# Patient Record
Sex: FEMALE | Race: WHITE
[De-identification: names, ages, dates, MRNs, and addresses within clinical notes are randomized per-mention and may not be internally consistent; named-entity substitution may affect disease eponyms.]

---

## 2017-01-19 ENCOUNTER — HOSPITAL ENCOUNTER (EMERGENCY)
Dept: HOSPITAL 75 - ER | Age: 27
Discharge: HOME | End: 2017-01-19
Payer: MEDICAID

## 2017-01-19 VITALS — BODY MASS INDEX: 42.11 KG/M2 | HEIGHT: 65 IN | WEIGHT: 252.75 LBS

## 2017-01-19 VITALS — DIASTOLIC BLOOD PRESSURE: 81 MMHG | SYSTOLIC BLOOD PRESSURE: 134 MMHG

## 2017-01-19 DIAGNOSIS — N92.0: Primary | ICD-10-CM

## 2017-01-19 LAB
BASOPHILS # BLD AUTO: 0 10^3/UL (ref 0–0.1)
BASOPHILS NFR BLD AUTO: 0 % (ref 0–10)
EOSINOPHIL # BLD AUTO: 0.1 10^3/UL (ref 0–0.3)
EOSINOPHIL NFR BLD AUTO: 1 % (ref 0–10)
ERYTHROCYTE [DISTWIDTH] IN BLOOD BY AUTOMATED COUNT: 12.8 % (ref 10–14.5)
LYMPHOCYTES # BLD AUTO: 2.8 X 10^3 (ref 1–4)
LYMPHOCYTES NFR BLD AUTO: 39 % (ref 12–44)
MCH RBC QN AUTO: 32 PG (ref 25–34)
MCHC RBC AUTO-ENTMCNC: 35 G/DL (ref 32–36)
MCV RBC AUTO: 92 FL (ref 80–99)
MONOCYTES # BLD AUTO: 0.6 X 10^3 (ref 0–1)
MONOCYTES NFR BLD AUTO: 8 % (ref 0–12)
NEUTROPHILS # BLD AUTO: 3.8 X 10^3 (ref 1.8–7.8)
NEUTROPHILS NFR BLD AUTO: 52 % (ref 42–75)
PLATELET # BLD: 305 10^3/UL (ref 130–400)
PMV BLD AUTO: 9.4 FL (ref 7.4–10.4)
RBC # BLD AUTO: 4.23 10^6/UL (ref 4.35–5.85)
WBC # BLD AUTO: 7.2 10^3/UL (ref 4.3–11)

## 2017-01-19 PROCEDURE — 36415 COLL VENOUS BLD VENIPUNCTURE: CPT

## 2017-01-19 PROCEDURE — 99283 EMERGENCY DEPT VISIT LOW MDM: CPT

## 2017-01-19 PROCEDURE — 84703 CHORIONIC GONADOTROPIN ASSAY: CPT

## 2017-01-19 PROCEDURE — 96372 THER/PROPH/DIAG INJ SC/IM: CPT

## 2017-01-19 PROCEDURE — 85025 COMPLETE CBC W/AUTO DIFF WBC: CPT

## 2017-01-19 NOTE — XMS REPORT
Continuity of Care Document

 Created on: 2015



YULI SANDS

External Reference #: [id number]

: 1990

Sex: Female



Demographics







 Address  [address] [address]

 

 Home Phone  [phone]

 

 Preferred Language  English

 

 Marital Status  Unknown

 

 Alevism Affiliation  Unknown

 

 Race  Unknown

 

 Ethnic Group  Unknown





Author







 Author  [facility]

 

 Organization  [facility]

 

 Address  Unknown

 

 Phone  Unavailable







Support







 Name  Relationship  Address  Phone

 

 JOEY CULVER MD  Caregiver  [address], [address]

[place]  [address]  [phone]

 

 DAHLIA CLAIRE MD  Caregiver  [address], [address]

[place]  [address]  [phone]

 

 NOEMY SANDS  Next Of Kin  [address] [place] [place] [address] [phone]







Care Team Providers







 Care Team Member Name  Role  Phone

 

 JOEY CULVER MD  PCP  [phone]







Insurance Providers







 Payer Name  Policy Number  Subscriber Name  Relationship

 

 Self Pay     Yuli Sands  18 Self / Same As Patient







Advance Directives







 Directive  Response  Recorded Date/Time

 

 Advance Directives  No  05/30/15 6:31am

 

 Health Care Power of   No  05/30/15 6:31am

 

 Organ Donor  Yes  05/30/15 6:31am

 

 Resuscitation Status  Full Code  05/30/15 6:31am







Problems

Medical Problems





 Problem  Onset Date  Status

 

 Urinary tract infection in pregnancy  Unknown  Active

 

 urinary tract infection  Unknown  Active

 

 Low back pain  Unknown  Active

 

 Threatened   Unknown  Active

 

 Bacterial vaginosis  Unknown  Active

 

 Urinary tract infection in pregnancy  Unknown  Active

 

 pregnancy  Unknown  Active

 

 Influenza  Unknown  Active

 

 Viral infection  Unknown  Active







Medications







 Medication  Dose  Route  Sig  Days/Qty  Instructions  Order Date  Discontinued 
Date  Status

 

 Sertraline HCl                 07  Discontinued

 

 Loratadine                 07  Discontinued

 

 [Tri-Nesa]                 09  Discontinued

 

 Trimethoprim/Sulfamethoxazole  1 Ea  PO  TWICE A DAY  20 Qty     09  Discontinued

 

 Nitrofurantoin Macrocrystals  1 Each  PO  TWICE A DAY  30 Qty  FOR INFECTION  
09  Discontinued

 

 Naproxen  1 Each  PO  TID PRN  20 Qty     09  Discontinued

 

 Acetaminophen/Hydrocodone Bitart                 09  
Discontinued

 

 Acetaminophen/Hydrocodone Bitart  1 - 2 Each  PO  Q4HR PRN  20 Qty     09  Discontinued

 

 [Ibuprofen]                 12/23/09  06/19/10  Discontinued

 

 Amoxicillin  1 Each  PO  FOUR TIMES DAILY  40 Qty  FOR INFECTION  12/23/09  01/
15/10  Discontinued

 

 Methylprednisolone  0  PO  AS DIRECTED  1 Qty     12/23/09  01/15/10  
Discontinued

 

 Azithromycin (Zpak)  0  PO  Z-SHASTA  6 Qty     02/24/10  03/26/10  Discontinued

 

 Promethazine Hcl                 03/26/10  06/19/10  Discontinued

 

 Cephalexin Monohydrate (Keflex)  1 Each  PO  FOUR TIMES DAILY  40 Qty     03/26
/10  02/19/11  Discontinued

 

 Promethazine HCl  25 Mg  IA  EVERY 4HRS  14 Qty     03/26/10  02/19/11  
Discontinued

 

 Metronidazole (Flagyl)  1 Each  PO  THREE TIMES A DAY  30 Qty     03/31/10  02/
19/11  Discontinued

 

 Nitrofurantoin Macrocrystals  1 Each  PO  TWICE A DAY  20 Qty  FOR INFECTION  
06/19/10  02/19/11  Discontinued

 

 Naproxen  1 Each  PO  TID PRN  20 Qty     06/19/10  02/19/11  Discontinued

 

 Ergocalciferol  50,000 Unit  PO  TWICE A WEEK        09/19/10  04/23/11  
Discontinued

 

 Omeprazole                 11  Discontinued

 

 Dicyclomine Hcl  1 Each  PO  QID PRN  14 Qty     11  
Discontinued

 

 Acetaminophen/Hydrocodone Bitart (Lorcet 5/325MG)  1 - 2 Tab  PO  AS NEEDED  
28 Qty  1-2 tabs  every 4 hrs. as needed for pain.  11  
Discontinued

 

 Clarithromycin (Biaxin Xl)  2 Each  PO  DAILY        11  
Discontinued

 

 Tetracycline Hcl  500 Mg  PO  TWICE A DAY        11  
Discontinued

 

 Omeprazole  20 Mg  PO  AS NEEDED        11  Discontinued

 

 Cefprozil  1 Each  PO  TWICE A DAY  20 Qty     11  Discontinued

 

 Prednisone  40 Mg  PO  DAILY  6 Qty  FOR PAIN AND SWELLING  11
  Discontinued

 

 Prenatal Vits W-Ca,Fe,Fa(<1MG)  1 Each  PO  DAILY        11  
Discontinued

 

 [Expectra]  1 Tab  PO  DAILY        11  Discontinued

 

 Naproxen Sodium  550 Mg  PO  TWICE A DAY  30 Qty     11  
Discontinued

 

 Acetaminophen/Hydrocodone Bitart  1 - 2 Ea  PO  Q4HR PRN  30 Qty     11  Discontinued

 

 Phentermine Hcl  30 Mg  PO  DAILY        10/11/11  10/03/13  Discontinued

 

 Clindamycin HCl  1 Each  PO  GIVE EVERY 6 HR ON SCHEDULE        10/30/11  10/03
/13  Discontinued

 

 Naproxen Sodium  220 Mg  PO           10/30/11  10/03/13  Discontinued

 

 Hydrocodone Bit/Acetaminophen  1 Each  PO           10/30/11  10/03/13  
Discontinued

 

 Clindamycin HCl  2 Each  PO  THREE TIMES A DAY  7 Days     10/30/11  10/03/13  
Discontinued

 

 Folic Acid  0.4 Mg  PO  BEDTIME        10/03/13  02/12/14  Discontinued

 

 Prenatal Vits W-Ca,Fe,Fa(<1MG)  1 Tab  PO  BEDTIME        10/03/13  12/24/14  
Discontinued

 

 Acetaminophen/Hydrocodone Bitart  1 - 2 Tab  PO  q3hrs. PRN  30 Qty     10/05/
13  01/18/14  Discontinued

 

 Docosahexanoic Acid  100 Mg  PO  DAILY        14  Discontinued

 

 Trimethoprim/Sulfamethoxazole  1 Ea  PO  TWICE A DAY  14 Qty  FOR INFECTION  14  Discontinued

 

 Metronidazole  1 Appful  VG  BEDTIME  5 Qty     14  
Discontinued

 

 Acetaminophen/Hydrocodone Bitart  1 Each  PO  EVERY 4HRS PRN PAIN  6 Qty     14  Discontinued

 

 Nitrofurantoin Macrocrystals  1 Cap  PO  TWICE A DAY  14 Qty     14  Discontinued

 

 Nitrofurantoin Macrocrystals  1 Cap  PO  TWICE A DAY  14 Qty  Clcr <60 mL/
minute: Contraindicated  14  Discontinued

 

 Nitrofurantoin Macrocrystals  1 Cap  PO  TWICE A DAY  10 Qty     14  Discontinued

 

 Cephalexin Monohydrate (Keflex)  1 Each  PO  THREE TIMES A DAY  21 Qty     04/
01/14  05/10/14  Discontinued

 

 Hydrocodone Bit/Acetaminophen  1 Tab  PO  EVERY 4HRS PRN PAIN  10 Qty     14  Discontinued

 

 Ondansetron  8 Mg  PO  EVERY 6 HOURS PRN NAUSEA/VOMITING  10 Qty     14  Discontinued

 

 Docosahexanoic Acid  100 Mg  PO  DAILY        05/10/14  08/08/14  Discontinued

 

 Famotidine  20 Mg  PO  TWICE A DAY PRN P  0 Qty     05/10/14  08/08/14  
Discontinued

 

 Omeprazole  40 Mg  PO  DAILY  30 Qty     14  Discontinued

 

 [Ibuprofen]  600 Mg  PO  EVERY 8HRS For PAIN  40 Qty     14  
Discontinued

 

 Ferrous Sulfate  325 Mg  PO  TWICE A DAY  60 Qty     14  
Discontinued

 

 Acetaminophen/Hydrocodone Bitart  1 Each  PO  EVERY 4HRS For PAIN  20 Qty     
14  Discontinued

 

 Oseltamivir Phosphate (Tamiflu)  75 Mg  PO  TWICE A DAY  10 Qty     12/24/14  
05/30/15  Discontinued

 

 Levofloxacin  750 Mg  PO  DAILY  5 Qty     12/24/14  05/30/15  Discontinued







Social History







 Social History Problem  Response  Recorded Date/Time

 

 Alcohol Use  Denies Use  2015 6:31am

 

 Recreational Drug Use  No  2015 6:31am

 

 Recent Foreign Travel  No  09/15/2014 6:47am

 

 Recent Infectious Disease Exposure  No  09/15/2014 6:47am

 

 Hospitalization with Isolation  Denies  2015 6:31am

 

 Sexually Transmitted Disease  No  2015 6:31am

 

 HIV/AIDS  No  2015 6:31am

 

 Smoking Status  Never a Smoker  2015 6:31am









 Query  Response  Start Date  Stop Date

 

 Smoking Status  Never a Smoker      







Hospital Discharge Instructions

No hospital discharge instructions.



Plan of Care

No plan of care.



Functional Status

No functional status results.



Allergies, Adverse Reactions, Alerts







 Allergen  Type  Severity  Reaction  Status  Last Updated

 

 No Known Drug Allergies           Active  14







Immunizations







 Name  Given  Type

 

 Date of Influenza Vaccine  14  Historical

 

 Hepatitis A  No  Historical

 

 Hepatitis B  Yes  Historical

 

 Tetanus Booster (TDap)  More than 5yrs  Historical







Vital Signs

Acute Vital Signs





 Vital  Response  Date/Time

 

 Temperature (Fahrenheit)  97.6 degrees F (97.6 - 99.5)   

 

 Temperature (Calculated Celsius)  36.00202 degrees C (36.4 - 37.5)   

 

 Pulse Rate (adult)  97 bpm (60 - 90)   

 

 Respiratory Rate  18 bpm (12 - 24)   

 

 O2 Sat by Pulse Oximetry  96 % (88 - 100)   

 

 Blood Pressure  130/63 mm Hg   

 

 Pain      

 

    Pain Intensity  1     

 

 Height (Feet)  5 feet    

 

 Height (Inches)  5 inches    

 

 Height (Calculated Centimeters)  165.349577 cm    

 

 Weight (Pounds)  260 pounds    

 

 Weight (Calculated Kilograms)  117.284296 kilograms    

 

 Calculated BMI  43.26     







Results

Laboratory Results







 Test Name  Result  Units  Flags  Reference  Collection Date/Time  Result Date/
Time  Comments

 

 White Blood Count  11.9  10^3/uL  H  4.3-11.0  2015 6:50am  2015 7:
05am   

 

 Red Blood Count  4.41  10^6/uL     4.35-5.85  2015 6:50am  2015 7:
05am   

 

 Hemoglobin  13.2  G/DL     11.5-16.0  2015 6:50am  2015 7:05am   

 

 Hematocrit  38  %     35-52  2015 6:50am  2015 7:05am   

 

 Mean Corpuscular Volume  87  FL     80-99  2015 6:50am  2015 7:
05am   

 

 Mean Corpuscular Hemoglobin  30  PG     25-34  2015 6:50am  2015 7:
05am   

 

 Mean Corpuscular Hemoglobin Concent  35  G/DL     32-36  2015 6:50am   7:05am   

 

 Red Cell Distribution Width  13.7  %     10.0-14.5  2015 6:50am  2015 7:05am   

 

 Platelet Count  329  10^3/uL     130-400  2015 6:50am  2015 7:05am
   

 

 Mean Platelet Volume  9.9  FL     7.4-10.4  2015 6:50am  2015 7:
05am   

 

 Neutrophils (%) (Auto)  60  %     42-75  2015 6:50am  2015 7:05am 
  

 

 Lymphocytes (%) (Auto)  31  %     12-44  2015 6:50am  2015 7:05am 
  

 

 Monocytes (%) (Auto)  8  %     0-12  2015 6:50am  2015 7:05am   

 

 Eosinophils (%) (Auto)  1  %     0-10  2015 6:50am  2015 7:05am   

 

 Basophils (%) (Auto)  0  %     0-10  2015 6:50am  2015 7:05am   

 

 Neutrophils # (Auto)  7.2  X 10^3     1.8-7.8  2015 6:50am  2015 7:
05am   

 

 Lymphocytes # (Auto)  3.7  X 10^3     1.0-4.0  2015 6:50am  2015 7:
05am   

 

 Monocytes # (Auto)  1.0  X 10^3     0.0-1.0  2015 6:50am  2015 7:
05am   

 

 Eosinophils # (Auto)  0.1  10^3/uL     0.0-0.3  2015 6:50am  2015 7
:05am   

 

 Basophils # (Auto)  0.0  10^3/uL     0.0-0.1  2015 6:50am  2015 7:
05am   

 

 Group A Streptococcus Screen  NEGATIVE        NEGATIVE  2015 6:50am   7:10am   







Procedures

No known history of procedures.



Encounters







 Encounter  Location  Date/Time

 

 Departed Emergency Room  [facility]  05/30/15 6:28am











 Recent Diagnosis

## 2017-01-19 NOTE — ED GU-FEMALE
General


Chief Complaint:  -Female


Stated Complaint:  HEAVY VAG BLEEDING,SHARP VAG PAIN


Nursing Triage Note:  


pt reports has had vaginal bleeding since the 7th of dec. pt reports she was 


seen by  last week and started bc regimine. Pt reports no improvement. 

Pt 


reports lower abdominal pain starting this am.


Nursing Sepsis Screen:  No Definite Risk


Source:  patient


Exam Limitations:  no limitations





History of Present Illness


Time seen by provider:  07:38


Initial Comments


The patient is a 26-year-old white female who reports that she has been having 

vaginal bleeding daily.  She reports that her last normal menstrual period Was 

in May 2016.  In June she missed her period and her pregnancy test was 

positive.  She then miscarried.  She spotted and had no period until August 

when she had what appeared to be a normal menses.  Since that time she has 

spotted until December 7 when she began to flow on a daily basis.  About that 

time she was started on birth control pills by Dr. Culver.  She has had pain at 

intervals which she does not describe  as cramping.  This morning while in the 

shower she reports that the blood began to pour out and she had more severe 

pain which does not seem to be cramping.


Timing/Duration:  changing over time


Severity/Quality:  severe


Location:  suprapubic


Radiation:  back


Activities at Onset:  none


Associated Symptoms:   denies symptoms





Allergies and Home Medications


Allergies


Coded Allergies:  


     No Known Drug Allergies (Unverified , 9/17/14)





Home Medications


Hydrocodone/Acetaminophen 1 Each Tablet #20 1 EACH PO Q4H 


   Prescribed by: MASOUD GUADARRAMA on 6/18/16 0638


Norgestimate-Ethinyl Estradiol 1 Each Tablet #28 1 TAB PO DAILY (Reported) 


Ondansetron 8 Mg Tab.rapdis #14 8 MG PO Q4H 


   Prescribed by: MASOUD GUADARRAMA on 6/18/16 0638


Phentermine HCl 37.5 Mg Capsule 37.5 MG PO DAILY (Reported) 





Constitutional:   see HPI


EENTM:   no symptoms reported


Respiratory:   no symptoms reported


Cardiovascular:   no symptoms reported


Gastrointestinal:   no symptoms reported


Musculoskeletal:   no symptoms reported


Skin:   no symptoms reported


Psychiatric/Neurological:   No Symptoms Reported


Endocrine:   No Symptoms Reported


Hematologic/Lymphatic:   No Symptoms Reported





Past Medical-Social-Family Hx


Patient Social History


Alcohol Use:  Denies Use


Recreational Drug Use:  No


Smoking Status:  Former Smoker


Type Used:  Cigarettes


Former Smoker/When Quit:  Sep 15, 2012


Recent Foreign Travel:  No


Contact w/Someone Who Travel:  No


Recent Infectious Disease Expo:  No


Recent Hopitalizations:  Yes (tonsillitis)


Physical Abuse Screen:  No


Sexual Abuse:  No





Immunizations Up To Date


Tetanus Booster (TDap):  More than 5yrs


Date of Influenza Vaccine:  Aug 4, 2016





Surgeries


HX Surgeries:  Yes (endometriosis, ovarian cyst removal, dnc, )


Surgeries:  Gallbladder





Respiratory


Hx Respiratory Disorders:  No





Cardiovascular


Hx Cardiac Disorders:  No





Neurological


Hx Neurological Disorders:  No





Reproductive System


Pregnant:  No


Hx Reproductive Disorders:  Yes (DUB, OVARIAN CYSTS, SEPTUM REMOVED FROM UTERUS

; MULTIPLE MISCARRIAGES)


Sexually Transmitted Disease:  No


HIV/AIDS:  No


Female Reproductive Disorders:  Endometriosis, Ovarian Cyst





Genitourinary


Hx Genitourinary Disorders:  No





Gastrointestinal


Hx Gastrointestinal Disorders:  Yes (gallbladder removed 2011)


Gastrointestinal Disorders:  Gall Bladder Disease





Musculoskeletal


Hx Musculoskeletal Disorders:  No





Endocrine


Hx Endocrine Disorders:  No





HEENT


HX ENT Disorders:  Yes (corrective lens)


HEENT Disorders:  Tonsilitis


Loss of Vision:  Bilateral


Hearing Impairment:  Denies





Cancer


Hx Cancer:  No





Psychosocial


Hx Psychiatric Problems:  No





Integumentary


HX Skin/Integumentary Disorder:  No





Blood Transfusions


Hx Blood Disorders:  No





Family Medical History


Family Medial History:  


Asthma


  19 MOTHER


  maternal gdma


Cardiovascular disease


  maternal gdma


Completed stroke


  MATERNAL GDPA


Deafness or hearing loss


  19 MOTHER


Diabetes mellitus


  19 FATHER


Myocardial infarction


  maternal gdma


  MATERNAL GDPA


Psychosocial problem


  19 MOTHER


Respiratory disorder


  19 MOTHER


  maternal gdma


Seizure disorder


  19 MOTHER (EPILEPSY)


  G8 BROTHER (EPILEPSY)





No Family History of:


  AIDS


  Abdominal aortic aneurysm


  [place]'s disease


  Alcoholism


  Alzheimer's disease


  Aphasia


  Arthritis


  Cancer of mouth


  Cataracts


  Colon cancer


  Congenital disease


  Congenital heart disease


  Coronary thrombosis


  Cystic fibrosis


  Dementia


  Drug abuse


  Dysphasia


  Fibrocystic disease of breast


  Gastroenteritis


  Glaucoma


  Headache disorder


  Hypercholesterolemia


  Hypertension


  Infertility


  Kidney disease


  Neoplasm


  Not obtainable due to adoption


  Osteoporosis


  Parkinson's disease


  Prostate cancer


  Severe allergy


  Thyroid disease


  Tuberculosis


  Visual disorder





Physical Exam


Vital Signs





 Vital Sign - Last 12Hours








 1/19/17





 06:45


 


Temp 97.2


 


Pulse 79


 


Resp 18


 


B/P 127/92


 


Pulse Ox 99


 


O2 Delivery Room Air





Capillary Refill : Less Than 3 Seconds


General Appearance:   mild distress


HEENT:   normal ENT inspection


Neck:   full range of motion


Cardiovascular:   normal peripheral pulses regular rate, rhythm no edema no 

gallop no JVD no murmur


Respiratory:   chest non-tender lungs clear normal breath sounds no respiratory 

distress no accessory muscle use respiratory distress


Genital/Rectal:   other (obese)


Back:   normal inspection no CVA tenderness no vertebral tenderness CVA 

tenderness (R) CVA tenderness (L)


Extremities:   normal range of motion non-tender normal inspection no pedal 

edema no calf tenderness normal capillary refill pelvis stable


Neurologic/Psychiatric:   CNs II-XII nml as tested no motor/sensory deficits 

alert normal mood/affect oriented x 3


Skin:   normal color warm/dry cyanosis cool diaphoresis pallor


Lymphatic:   no adenopathy axilla node tender (R) axilla node tender (L) 

inguinal node tender (R) inguinal node tender (L)





Progress/Results/Core Measures


Results/Orders


Lab Results





 Laboratory Tests








Test


  1/19/17


06:50 Range/Units


 


 


Basophils # (Auto)


  0.0 


  0.0-0.1


10^3/uL


 


Basophils (%) (Auto) 0  0-10  %


 


Eosinophils # (Auto)


  0.1 


  0.0-0.3


10^3/uL


 


Eosinophils (%) (Auto) 1  0-10  %


 


Hematocrit 39  35-52  %


 


Hemoglobin 13.6  11.5-16.0  G/DL


 


Lymphocytes # (Auto) 2.8  1.0-4.0  X 10^3


 


Lymphocytes (%) (Auto) 39  12-44  %


 


Mean Corpuscular Hemoglobin 32  25-34  PG


 


Mean Corpuscular Hemoglobin


Concent 35 


  32-36  G/DL


 


 


Mean Corpuscular Volume 92  80-99  FL


 


Mean Platelet Volume 9.4  7.4-10.4  FL


 


Monocytes # (Auto) 0.6  0.0-1.0  X 10^3


 


Monocytes (%) (Auto) 8  0-12  %


 


Neutrophils # (Auto) 3.8  1.8-7.8  X 10^3


 


Neutrophils (%) (Auto) 52  42-75  %


 


Platelet Count


  305 


  130-400


10^3/uL


 


Red Blood Count


  4.23 L


  4.35-5.85


10^6/uL


 


Red Cell Distribution Width 12.8  10.0-14.5  %


 


Serum Pregnancy Test,


Qualitative NEGATIVE 


  NEGATIVE  


 


 


White Blood Count


  7.2 


  4.3-11.0


10^3/uL








My Orders





 Orders-DAHLIA CLAIRE MD


Cbc With Automated Diff (1/19/17 06:44)


Hcg,Qualitative Serum (1/19/17 06:44)





Vital Signs/I&O





 Vital Sign - Last 12Hours








 1/19/17





 06:45


 


Temp 97.2


 


Pulse 79


 


Resp 18


 


B/P 127/92


 


Pulse Ox 99


 


O2 Delivery Room Air








Blood Pressure Mean:  104





Departure


Communication


Progress Notes


0806 discussed with Dr. Culver.  He advises her to call the office this morning 

for a reasonably immediate appointment.





Impression


Impression:  


 Primary Impression:  


 Menorrhagia with irregular cycle


Disposition:  01 HOME, SELF-CARE


Condition:  Stable/Unchanged





Departure-Patient Inst.


Decision time for Depature:  08:09


Referrals:  


JOEY CULVER MD (PCP/Family)


Primary Care Physician





Add. Discharge Instructions:


All discharge instructions reviewed with patient and/or family. Voiced 

understanding.


Use Motrin 600 mg 3 times daily for pain.  Continue hormone therapy.





Call Dr. Hannah's office today for appointment.








DAHLIA CLAIRE MD Jan 19, 2017 07:49

## 2017-01-20 ENCOUNTER — HOSPITAL ENCOUNTER (OUTPATIENT)
Dept: HOSPITAL 75 - RAD | Age: 27
End: 2017-01-20
Attending: FAMILY MEDICINE
Payer: MEDICAID

## 2017-01-20 DIAGNOSIS — N93.9: Primary | ICD-10-CM

## 2017-01-20 PROCEDURE — 76856 US EXAM PELVIC COMPLETE: CPT

## 2017-01-20 PROCEDURE — 76830 TRANSVAGINAL US NON-OB: CPT

## 2017-01-20 NOTE — XMS REPORT
Continuity of Care Document

 Created on: 2015



YULI SANDS

External Reference #: [id number]

: 1990

Sex: Female



Demographics







 Address  [address] [address]

 

 Home Phone  [phone]

 

 Preferred Language  English

 

 Marital Status  Unknown

 

 Sabianist Affiliation  Unknown

 

 Race  Unknown

 

 Ethnic Group  Unknown





Author







 Author  [facility]

 

 Organization  [facility]

 

 Address  Unknown

 

 Phone  Unavailable







Support







 Name  Relationship  Address  Phone

 

 JOEY CULVER MD  Caregiver  [address], [address]

[place]  [address]  [phone]

 

 DAHLIA CLAIRE MD  Caregiver  [address], [address]

[place]  [address]  [phone]

 

 NOEMY SANDS  Next Of Kin  [address] [address] [phone]







Care Team Providers







 Care Team Member Name  Role  Phone

 

 JOEY CULVER MD  PCP  [phone]







Insurance Providers







 Payer Name  Policy Number  Subscriber Name  Relationship

 

 Self Pay     Yuli Sands  18 Self / Same As Patient







Advance Directives







 Directive  Response  Recorded Date/Time

 

 Advance Directives  No  05/30/15 6:31am

 

 Health Care Power of   No  05/30/15 6:31am

 

 Organ Donor  Yes  05/30/15 6:31am

 

 Resuscitation Status  Full Code  05/30/15 6:31am







Problems

Medical Problems





 Problem  Onset Date  Status

 

 Urinary tract infection in pregnancy  Unknown  Active

 

 urinary tract infection  Unknown  Active

 

 Low back pain  Unknown  Active

 

 Threatened   Unknown  Active

 

 Bacterial vaginosis  Unknown  Active

 

 Urinary tract infection in pregnancy  Unknown  Active

 

 pregnancy  Unknown  Active

 

 Influenza  Unknown  Active

 

 Viral infection  Unknown  Active







Medications







 Medication  Dose  Route  Sig  Days/Qty  Instructions  Order Date  Discontinued 
Date  Status

 

 Sertraline HCl                 07  Discontinued

 

 Loratadine                 07  Discontinued

 

 [Tri-Nesa]                 09  Discontinued

 

 Trimethoprim/Sulfamethoxazole  1 Ea  PO  TWICE A DAY  20 Qty     09  Discontinued

 

 Nitrofurantoin Macrocrystals  1 Each  PO  TWICE A DAY  30 Qty  FOR INFECTION  
09  Discontinued

 

 Naproxen  1 Each  PO  TID PRN  20 Qty     09  Discontinued

 

 Acetaminophen/Hydrocodone Bitart                 09  
Discontinued

 

 Acetaminophen/Hydrocodone Bitart  1 - 2 Each  PO  Q4HR PRN  20 Qty     09  Discontinued

 

 [Ibuprofen]                 12/23/09  06/19/10  Discontinued

 

 Amoxicillin  1 Each  PO  FOUR TIMES DAILY  40 Qty  FOR INFECTION  12/23/09  01/
15/10  Discontinued

 

 Methylprednisolone  0  PO  AS DIRECTED  1 Qty     12/23/09  01/15/10  
Discontinued

 

 Azithromycin (Zpak)  0  PO  Z-SHASTA  6 Qty     02/24/10  03/26/10  Discontinued

 

 Promethazine Hcl                 03/26/10  06/19/10  Discontinued

 

 Cephalexin Monohydrate (Keflex)  1 Each  PO  FOUR TIMES DAILY  40 Qty     03/26
/10  02/19/11  Discontinued

 

 Promethazine HCl  25 Mg  VT  EVERY 4HRS  14 Qty     03/26/10  02/19/11  
Discontinued

 

 Metronidazole (Flagyl)  1 Each  PO  THREE TIMES A DAY  30 Qty     03/31/10  02/
19/11  Discontinued

 

 Nitrofurantoin Macrocrystals  1 Each  PO  TWICE A DAY  20 Qty  FOR INFECTION  
06/19/10  02/19/11  Discontinued

 

 Naproxen  1 Each  PO  TID PRN  20 Qty     06/19/10  02/19/11  Discontinued

 

 Ergocalciferol  50,000 Unit  PO  TWICE A WEEK        09/19/10  04/23/11  
Discontinued

 

 Omeprazole                 11  Discontinued

 

 Dicyclomine Hcl  1 Each  PO  QID PRN  14 Qty     11  
Discontinued

 

 Acetaminophen/Hydrocodone Bitart (Lorcet 5/325MG)  1 - 2 Tab  PO  AS NEEDED  
28 Qty  1-2 tabs  every 4 hrs. as needed for pain.  11  
Discontinued

 

 Clarithromycin (Biaxin Xl)  2 Each  PO  DAILY        11  
Discontinued

 

 Tetracycline Hcl  500 Mg  PO  TWICE A DAY        11  
Discontinued

 

 Omeprazole  20 Mg  PO  AS NEEDED        11  Discontinued

 

 Cefprozil  1 Each  PO  TWICE A DAY  20 Qty     11  Discontinued

 

 Prednisone  40 Mg  PO  DAILY  6 Qty  FOR PAIN AND SWELLING  11
  Discontinued

 

 Prenatal Vits W-Ca,Fe,Fa(<1MG)  1 Each  PO  DAILY        11  
Discontinued

 

 [Expectra]  1 Tab  PO  DAILY        11  Discontinued

 

 Naproxen Sodium  550 Mg  PO  TWICE A DAY  30 Qty     11  
Discontinued

 

 Acetaminophen/Hydrocodone Bitart  1 - 2 Ea  PO  Q4HR PRN  30 Qty     11  Discontinued

 

 Phentermine Hcl  30 Mg  PO  DAILY        10/11/11  10/03/13  Discontinued

 

 Clindamycin HCl  1 Each  PO  GIVE EVERY 6 HR ON SCHEDULE        10/30/11  10/03
/13  Discontinued

 

 Naproxen Sodium  220 Mg  PO           10/30/11  10/03/13  Discontinued

 

 Hydrocodone Bit/Acetaminophen  1 Each  PO           10/30/11  10/03/13  
Discontinued

 

 Clindamycin HCl  2 Each  PO  THREE TIMES A DAY  7 Days     10/30/11  10/03/13  
Discontinued

 

 Folic Acid  0.4 Mg  PO  BEDTIME        10/03/13  02/12/14  Discontinued

 

 Prenatal Vits W-Ca,Fe,Fa(<1MG)  1 Tab  PO  BEDTIME        10/03/13  12/24/14  
Discontinued

 

 Acetaminophen/Hydrocodone Bitart  1 - 2 Tab  PO  q3hrs. PRN  30 Qty     10/05/
13  01/18/14  Discontinued

 

 Docosahexanoic Acid  100 Mg  PO  DAILY        14  Discontinued

 

 Trimethoprim/Sulfamethoxazole  1 Ea  PO  TWICE A DAY  14 Qty  FOR INFECTION  14  Discontinued

 

 Metronidazole  1 Appful  VG  BEDTIME  5 Qty     14  
Discontinued

 

 Acetaminophen/Hydrocodone Bitart  1 Each  PO  EVERY 4HRS PRN PAIN  6 Qty     14  Discontinued

 

 Nitrofurantoin Macrocrystals  1 Cap  PO  TWICE A DAY  14 Qty     14  Discontinued

 

 Nitrofurantoin Macrocrystals  1 Cap  PO  TWICE A DAY  14 Qty  Clcr <60 mL/
minute: Contraindicated  14  Discontinued

 

 Nitrofurantoin Macrocrystals  1 Cap  PO  TWICE A DAY  10 Qty     14  Discontinued

 

 Cephalexin Monohydrate (Keflex)  1 Each  PO  THREE TIMES A DAY  21 Qty     04/
01/14  05/10/14  Discontinued

 

 Hydrocodone Bit/Acetaminophen  1 Tab  PO  EVERY 4HRS PRN PAIN  10 Qty     14  Discontinued

 

 Ondansetron  8 Mg  PO  EVERY 6 HOURS PRN NAUSEA/VOMITING  10 Qty     14  Discontinued

 

 Docosahexanoic Acid  100 Mg  PO  DAILY        05/10/14  08/08/14  Discontinued

 

 Famotidine  20 Mg  PO  TWICE A DAY PRN P  0 Qty     05/10/14  08/08/14  
Discontinued

 

 Omeprazole  40 Mg  PO  DAILY  30 Qty     14  Discontinued

 

 [Ibuprofen]  600 Mg  PO  EVERY 8HRS For PAIN  40 Qty     14  
Discontinued

 

 Ferrous Sulfate  325 Mg  PO  TWICE A DAY  60 Qty     14  
Discontinued

 

 Acetaminophen/Hydrocodone Bitart  1 Each  PO  EVERY 4HRS For PAIN  20 Qty     
14  Discontinued

 

 Oseltamivir Phosphate (Tamiflu)  75 Mg  PO  TWICE A DAY  10 Qty     12/24/14  
05/30/15  Discontinued

 

 Levofloxacin  750 Mg  PO  DAILY  5 Qty     12/24/14  05/30/15  Discontinued







Social History







 Social History Problem  Response  Recorded Date/Time

 

 Alcohol Use  Denies Use  2015 6:31am

 

 Recreational Drug Use  No  2015 6:31am

 

 Recent Foreign Travel  No  09/15/2014 6:47am

 

 Recent Infectious Disease Exposure  No  09/15/2014 6:47am

 

 Hospitalization with Isolation  Denies  2015 6:31am

 

 Sexually Transmitted Disease  No  2015 6:31am

 

 HIV/AIDS  No  2015 6:31am

 

 Smoking Status  Never a Smoker  2015 6:31am









 Query  Response  Start Date  Stop Date

 

 Smoking Status  Never a Smoker      







Hospital Discharge Instructions

No hospital discharge instructions.



Plan of Care

No plan of care.



Functional Status

No functional status results.



Allergies, Adverse Reactions, Alerts







 Allergen  Type  Severity  Reaction  Status  Last Updated

 

 No Known Drug Allergies           Active  14







Immunizations







 Name  Given  Type

 

 Date of Influenza Vaccine  14  Historical

 

 Hepatitis A  No  Historical

 

 Hepatitis B  Yes  Historical

 

 Tetanus Booster (TDap)  More than 5yrs  Historical







Vital Signs

Acute Vital Signs





 Vital  Response  Date/Time

 

 Temperature (Fahrenheit)  97.6 degrees F (97.6 - 99.5)   

 

 Temperature (Calculated Celsius)  36.85338 degrees C (36.4 - 37.5)   

 

 Pulse Rate (adult)  97 bpm (60 - 90)   

 

 Respiratory Rate  18 bpm (12 - 24)   

 

 O2 Sat by Pulse Oximetry  96 % (88 - 100)   

 

 Blood Pressure  130/63 mm Hg   

 

 Pain      

 

    Pain Intensity  1     

 

 Height (Feet)  5 feet    

 

 Height (Inches)  5 inches    

 

 Height (Calculated Centimeters)  165.470799 cm    

 

 Weight (Pounds)  260 pounds    

 

 Weight (Calculated Kilograms)  117.682277 kilograms    

 

 Calculated BMI  43.26     







Results

Laboratory Results







 Test Name  Result  Units  Flags  Reference  Collection Date/Time  Result Date/
Time  Comments

 

 White Blood Count  11.9  10^3/uL  H  4.3-11.0  2015 6:50am  2015 7:
05am   

 

 Red Blood Count  4.41  10^6/uL     4.35-5.85  2015 6:50am  2015 7:
05am   

 

 Hemoglobin  13.2  G/DL     11.5-16.0  2015 6:50am  2015 7:05am   

 

 Hematocrit  38  %     35-52  2015 6:50am  2015 7:05am   

 

 Mean Corpuscular Volume  87  FL     80-99  2015 6:50am  2015 7:
05am   

 

 Mean Corpuscular Hemoglobin  30  PG     25-34  2015 6:50am  2015 7:
05am   

 

 Mean Corpuscular Hemoglobin Concent  35  G/DL     32-36  2015 6:50am   7:05am   

 

 Red Cell Distribution Width  13.7  %     10.0-14.5  2015 6:50am  2015 7:05am   

 

 Platelet Count  329  10^3/uL     130-400  2015 6:50am  2015 7:05am
   

 

 Mean Platelet Volume  9.9  FL     7.4-10.4  2015 6:50am  2015 7:
05am   

 

 Neutrophils (%) (Auto)  60  %     42-75  2015 6:50am  2015 7:05am 
  

 

 Lymphocytes (%) (Auto)  31  %     12-44  2015 6:50am  2015 7:05am 
  

 

 Monocytes (%) (Auto)  8  %     0-12  2015 6:50am  2015 7:05am   

 

 Eosinophils (%) (Auto)  1  %     0-10  2015 6:50am  2015 7:05am   

 

 Basophils (%) (Auto)  0  %     0-10  2015 6:50am  2015 7:05am   

 

 Neutrophils # (Auto)  7.2  X 10^3     1.8-7.8  2015 6:50am  2015 7:
05am   

 

 Lymphocytes # (Auto)  3.7  X 10^3     1.0-4.0  2015 6:50am  2015 7:
05am   

 

 Monocytes # (Auto)  1.0  X 10^3     0.0-1.0  2015 6:50am  2015 7:
05am   

 

 Eosinophils # (Auto)  0.1  10^3/uL     0.0-0.3  2015 6:50am  2015 7
:05am   

 

 Basophils # (Auto)  0.0  10^3/uL     0.0-0.1  2015 6:50am  2015 7:
05am   

 

 Group A Streptococcus Screen  NEGATIVE        NEGATIVE  2015 6:50am   7:10am   







Procedures

No known history of procedures.



Encounters







 Encounter  Location  Date/Time

 

 Departed Emergency Room  [facility]  05/30/15 6:28am











 Recent Diagnosis

## 2017-01-20 NOTE — DIAGNOSTIC IMAGING REPORT
EXAMINATION:

Transabdominal and transvaginal pelvic ultrasound.



INDICATION:

Abnormal uterine bleeding.



FINDINGS:

The uterus is 7.8 x 6.2 x 4.3 cm. The endometrial stripe is 0.9

cm in thickness. The uterus is slightly heterogenous with no

discrete mass. The ovaries are obscured by bowel gas.



IMPRESSION:

Slightly heterogenous myometrium with no discrete lesion. The

ovaries are not seen.



Dictated by:



Dictated on workstation # [id number]

## 2017-01-21 ENCOUNTER — HOSPITAL ENCOUNTER (EMERGENCY)
Dept: HOSPITAL 75 - ER | Age: 27
Discharge: HOME | End: 2017-01-21
Payer: MEDICAID

## 2017-01-21 VITALS — SYSTOLIC BLOOD PRESSURE: 129 MMHG | DIASTOLIC BLOOD PRESSURE: 83 MMHG

## 2017-01-21 VITALS — HEIGHT: 65 IN | WEIGHT: 250 LBS | BODY MASS INDEX: 41.65 KG/M2

## 2017-01-21 DIAGNOSIS — R10.30: Primary | ICD-10-CM

## 2017-01-21 DIAGNOSIS — N93.8: ICD-10-CM

## 2017-01-21 LAB
ALBUMIN SERPL-MCNC: 4.1 G/DL (ref 3.2–4.5)
ALT SERPL-CCNC: 18 U/L (ref 0–55)
ANION GAP SERPL CALC-SCNC: 11 MMOL/L (ref 5–14)
AST SERPL-CCNC: 15 U/L (ref 5–34)
BASOPHILS # BLD AUTO: 0 10^3/UL (ref 0–0.1)
BASOPHILS NFR BLD AUTO: 0 % (ref 0–10)
BILIRUB SERPL-MCNC: 0.2 MG/DL (ref 0.1–1)
BILIRUB UR QL STRIP: NEGATIVE
BUN SERPL-MCNC: 11 MG/DL (ref 7–18)
BUN/CREAT SERPL: 16
CALCIUM SERPL-MCNC: 9.4 MG/DL (ref 8.5–10.1)
CHLORIDE SERPL-SCNC: 107 MMOL/L (ref 98–107)
CO2 SERPL-SCNC: 20 MMOL/L (ref 21–32)
CREAT SERPL-MCNC: 0.67 MG/DL (ref 0.6–1.3)
EOSINOPHIL # BLD AUTO: 0.1 10^3/UL (ref 0–0.3)
EOSINOPHIL NFR BLD AUTO: 1 % (ref 0–10)
ERYTHROCYTE [DISTWIDTH] IN BLOOD BY AUTOMATED COUNT: 12.5 % (ref 10–14.5)
GFR SERPLBLD BASED ON 1.73 SQ M-ARVRAT: > 60 ML/MIN
GLUCOSE SERPL-MCNC: 83 MG/DL (ref 70–105)
KETONES UR QL STRIP: NEGATIVE
LEUKOCYTE ESTERASE UR QL STRIP: (no result)
LYMPHOCYTES # BLD AUTO: 3 X 10^3 (ref 1–4)
LYMPHOCYTES NFR BLD AUTO: 34 % (ref 12–44)
MCH RBC QN AUTO: 32 PG (ref 25–34)
MCHC RBC AUTO-ENTMCNC: 35 G/DL (ref 32–36)
MCV RBC AUTO: 91 FL (ref 80–99)
MONOCYTES # BLD AUTO: 0.6 X 10^3 (ref 0–1)
MONOCYTES NFR BLD AUTO: 7 % (ref 0–12)
NEUTROPHILS # BLD AUTO: 5.2 X 10^3 (ref 1.8–7.8)
NEUTROPHILS NFR BLD AUTO: 59 % (ref 42–75)
NEUTS BAND NFR BLD MANUAL: 51 %
NITRITE UR QL STRIP: NEGATIVE
PH UR STRIP: 6 [PH] (ref 5–9)
PLATELET # BLD: 328 10^3/UL (ref 130–400)
PMV BLD AUTO: 9.6 FL (ref 7.4–10.4)
POTASSIUM SERPL-SCNC: 3.9 MMOL/L (ref 3.6–5)
PROT SERPL-MCNC: 7.4 G/DL (ref 6.4–8.2)
PROT UR QL STRIP: (no result)
RBC # BLD AUTO: 4.49 10^6/UL (ref 4.35–5.85)
SODIUM SERPL-SCNC: 138 MMOL/L (ref 135–145)
SP GR UR STRIP: 1.02 (ref 1.02–1.02)
SQUAMOUS #/AREA URNS HPF: (no result) /HPF
UROBILINOGEN UR-MCNC: NORMAL MG/DL
VARIANT LYMPHS NFR BLD MANUAL: 42 %
WBC # BLD AUTO: 8.8 10^3/UL (ref 4.3–11)
WBC #/AREA URNS HPF: (no result) /HPF

## 2017-01-21 PROCEDURE — 99283 EMERGENCY DEPT VISIT LOW MDM: CPT

## 2017-01-21 PROCEDURE — 81000 URINALYSIS NONAUTO W/SCOPE: CPT

## 2017-01-21 PROCEDURE — 80053 COMPREHEN METABOLIC PANEL: CPT

## 2017-01-21 PROCEDURE — 96372 THER/PROPH/DIAG INJ SC/IM: CPT

## 2017-01-21 PROCEDURE — 84703 CHORIONIC GONADOTROPIN ASSAY: CPT

## 2017-01-21 PROCEDURE — 85027 COMPLETE CBC AUTOMATED: CPT

## 2017-01-21 PROCEDURE — 85007 BL SMEAR W/DIFF WBC COUNT: CPT

## 2017-01-21 PROCEDURE — 36415 COLL VENOUS BLD VENIPUNCTURE: CPT

## 2017-01-21 NOTE — XMS REPORT
Continuity of Care Document

 Created on: 2015



YULI SANDS

External Reference #: [id number]

: 1990

Sex: Female



Demographics







 Address  [address] [address]

 

 Home Phone  [phone]

 

 Preferred Language  English

 

 Marital Status  Unknown

 

 Mu-ism Affiliation  Unknown

 

 Race  Unknown

 

 Ethnic Group  Unknown





Author







 Author  [facility]

 

 Organization  [facility]

 

 Address  Unknown

 

 Phone  Unavailable







Support







 Name  Relationship  Address  Phone

 

 JOEY CULVER MD  Caregiver  [address], [address]

[place]  [address]  [phone]

 

 DAHLIA CLAIRE MD  Caregiver  [address], [address]

[place]  [address]  [phone]

 

 NOEMY SANDS  Next Of Kin  [address] [address] [phone]







Care Team Providers







 Care Team Member Name  Role  Phone

 

 JOEY CULVER MD  PCP  [phone]







Insurance Providers







 Payer Name  Policy Number  Subscriber Name  Relationship

 

 Self Pay     Yuli Sands  18 Self / Same As Patient







Advance Directives







 Directive  Response  Recorded Date/Time

 

 Advance Directives  No  05/30/15 6:31am

 

 Health Care Power of   No  05/30/15 6:31am

 

 Organ Donor  Yes  05/30/15 6:31am

 

 Resuscitation Status  Full Code  05/30/15 6:31am







Problems

Medical Problems





 Problem  Onset Date  Status

 

 Urinary tract infection in pregnancy  Unknown  Active

 

 urinary tract infection  Unknown  Active

 

 Low back pain  Unknown  Active

 

 Threatened   Unknown  Active

 

 Bacterial vaginosis  Unknown  Active

 

 Urinary tract infection in pregnancy  Unknown  Active

 

 pregnancy  Unknown  Active

 

 Influenza  Unknown  Active

 

 Viral infection  Unknown  Active







Medications







 Medication  Dose  Route  Sig  Days/Qty  Instructions  Order Date  Discontinued 
Date  Status

 

 Sertraline HCl                 07  Discontinued

 

 Loratadine                 07  Discontinued

 

 [Tri-Nesa]                 09  Discontinued

 

 Trimethoprim/Sulfamethoxazole  1 Ea  PO  TWICE A DAY  20 Qty     09  Discontinued

 

 Nitrofurantoin Macrocrystals  1 Each  PO  TWICE A DAY  30 Qty  FOR INFECTION  
09  Discontinued

 

 Naproxen  1 Each  PO  TID PRN  20 Qty     09  Discontinued

 

 Acetaminophen/Hydrocodone Bitart                 09  
Discontinued

 

 Acetaminophen/Hydrocodone Bitart  1 - 2 Each  PO  Q4HR PRN  20 Qty     09  Discontinued

 

 [Ibuprofen]                 12/23/09  06/19/10  Discontinued

 

 Amoxicillin  1 Each  PO  FOUR TIMES DAILY  40 Qty  FOR INFECTION  12/23/09  01/
15/10  Discontinued

 

 Methylprednisolone  0  PO  AS DIRECTED  1 Qty     12/23/09  01/15/10  
Discontinued

 

 Azithromycin (Zpak)  0  PO  Z-SHASTA  6 Qty     02/24/10  03/26/10  Discontinued

 

 Promethazine Hcl                 03/26/10  06/19/10  Discontinued

 

 Cephalexin Monohydrate (Keflex)  1 Each  PO  FOUR TIMES DAILY  40 Qty     03/26
/10  02/19/11  Discontinued

 

 Promethazine HCl  25 Mg  IA  EVERY 4HRS  14 Qty     03/26/10  02/19/11  
Discontinued

 

 Metronidazole (Flagyl)  1 Each  PO  THREE TIMES A DAY  30 Qty     03/31/10  02/
19/11  Discontinued

 

 Nitrofurantoin Macrocrystals  1 Each  PO  TWICE A DAY  20 Qty  FOR INFECTION  
06/19/10  02/19/11  Discontinued

 

 Naproxen  1 Each  PO  TID PRN  20 Qty     06/19/10  02/19/11  Discontinued

 

 Ergocalciferol  50,000 Unit  PO  TWICE A WEEK        09/19/10  04/23/11  
Discontinued

 

 Omeprazole                 11  Discontinued

 

 Dicyclomine Hcl  1 Each  PO  QID PRN  14 Qty     11  
Discontinued

 

 Acetaminophen/Hydrocodone Bitart (Lorcet 5/325MG)  1 - 2 Tab  PO  AS NEEDED  
28 Qty  1-2 tabs  every 4 hrs. as needed for pain.  11  
Discontinued

 

 Clarithromycin (Biaxin Xl)  2 Each  PO  DAILY        11  
Discontinued

 

 Tetracycline Hcl  500 Mg  PO  TWICE A DAY        11  
Discontinued

 

 Omeprazole  20 Mg  PO  AS NEEDED        11  Discontinued

 

 Cefprozil  1 Each  PO  TWICE A DAY  20 Qty     11  Discontinued

 

 Prednisone  40 Mg  PO  DAILY  6 Qty  FOR PAIN AND SWELLING  11
  Discontinued

 

 Prenatal Vits W-Ca,Fe,Fa(<1MG)  1 Each  PO  DAILY        11  
Discontinued

 

 [Expectra]  1 Tab  PO  DAILY        11  Discontinued

 

 Naproxen Sodium  550 Mg  PO  TWICE A DAY  30 Qty     11  
Discontinued

 

 Acetaminophen/Hydrocodone Bitart  1 - 2 Ea  PO  Q4HR PRN  30 Qty     11  Discontinued

 

 Phentermine Hcl  30 Mg  PO  DAILY        10/11/11  10/03/13  Discontinued

 

 Clindamycin HCl  1 Each  PO  GIVE EVERY 6 HR ON SCHEDULE        10/30/11  10/03
/13  Discontinued

 

 Naproxen Sodium  220 Mg  PO           10/30/11  10/03/13  Discontinued

 

 Hydrocodone Bit/Acetaminophen  1 Each  PO           10/30/11  10/03/13  
Discontinued

 

 Clindamycin HCl  2 Each  PO  THREE TIMES A DAY  7 Days     10/30/11  10/03/13  
Discontinued

 

 Folic Acid  0.4 Mg  PO  BEDTIME        10/03/13  02/12/14  Discontinued

 

 Prenatal Vits W-Ca,Fe,Fa(<1MG)  1 Tab  PO  BEDTIME        10/03/13  12/24/14  
Discontinued

 

 Acetaminophen/Hydrocodone Bitart  1 - 2 Tab  PO  q3hrs. PRN  30 Qty     10/05/
13  01/18/14  Discontinued

 

 Docosahexanoic Acid  100 Mg  PO  DAILY        14  Discontinued

 

 Trimethoprim/Sulfamethoxazole  1 Ea  PO  TWICE A DAY  14 Qty  FOR INFECTION  14  Discontinued

 

 Metronidazole  1 Appful  VG  BEDTIME  5 Qty     14  
Discontinued

 

 Acetaminophen/Hydrocodone Bitart  1 Each  PO  EVERY 4HRS PRN PAIN  6 Qty     14  Discontinued

 

 Nitrofurantoin Macrocrystals  1 Cap  PO  TWICE A DAY  14 Qty     14  Discontinued

 

 Nitrofurantoin Macrocrystals  1 Cap  PO  TWICE A DAY  14 Qty  Clcr <60 mL/
minute: Contraindicated  14  Discontinued

 

 Nitrofurantoin Macrocrystals  1 Cap  PO  TWICE A DAY  10 Qty     14  Discontinued

 

 Cephalexin Monohydrate (Keflex)  1 Each  PO  THREE TIMES A DAY  21 Qty     04/
01/14  05/10/14  Discontinued

 

 Hydrocodone Bit/Acetaminophen  1 Tab  PO  EVERY 4HRS PRN PAIN  10 Qty     14  Discontinued

 

 Ondansetron  8 Mg  PO  EVERY 6 HOURS PRN NAUSEA/VOMITING  10 Qty     14  Discontinued

 

 Docosahexanoic Acid  100 Mg  PO  DAILY        05/10/14  08/08/14  Discontinued

 

 Famotidine  20 Mg  PO  TWICE A DAY PRN P  0 Qty     05/10/14  08/08/14  
Discontinued

 

 Omeprazole  40 Mg  PO  DAILY  30 Qty     14  Discontinued

 

 [Ibuprofen]  600 Mg  PO  EVERY 8HRS For PAIN  40 Qty     14  
Discontinued

 

 Ferrous Sulfate  325 Mg  PO  TWICE A DAY  60 Qty     14  
Discontinued

 

 Acetaminophen/Hydrocodone Bitart  1 Each  PO  EVERY 4HRS For PAIN  20 Qty     
14  Discontinued

 

 Oseltamivir Phosphate (Tamiflu)  75 Mg  PO  TWICE A DAY  10 Qty     12/24/14  
05/30/15  Discontinued

 

 Levofloxacin  750 Mg  PO  DAILY  5 Qty     12/24/14  05/30/15  Discontinued







Social History







 Social History Problem  Response  Recorded Date/Time

 

 Alcohol Use  Denies Use  2015 6:31am

 

 Recreational Drug Use  No  2015 6:31am

 

 Recent Foreign Travel  No  09/15/2014 6:47am

 

 Recent Infectious Disease Exposure  No  09/15/2014 6:47am

 

 Hospitalization with Isolation  Denies  2015 6:31am

 

 Sexually Transmitted Disease  No  2015 6:31am

 

 HIV/AIDS  No  2015 6:31am

 

 Smoking Status  Never a Smoker  2015 6:31am









 Query  Response  Start Date  Stop Date

 

 Smoking Status  Never a Smoker      







Hospital Discharge Instructions

No hospital discharge instructions.



Plan of Care

No plan of care.



Functional Status

No functional status results.



Allergies, Adverse Reactions, Alerts







 Allergen  Type  Severity  Reaction  Status  Last Updated

 

 No Known Drug Allergies           Active  14







Immunizations







 Name  Given  Type

 

 Date of Influenza Vaccine  14  Historical

 

 Hepatitis A  No  Historical

 

 Hepatitis B  Yes  Historical

 

 Tetanus Booster (TDap)  More than 5yrs  Historical







Vital Signs

Acute Vital Signs





 Vital  Response  Date/Time

 

 Temperature (Fahrenheit)  97.6 degrees F (97.6 - 99.5)   

 

 Temperature (Calculated Celsius)  36.35616 degrees C (36.4 - 37.5)   

 

 Pulse Rate (adult)  97 bpm (60 - 90)   

 

 Respiratory Rate  18 bpm (12 - 24)   

 

 O2 Sat by Pulse Oximetry  96 % (88 - 100)   

 

 Blood Pressure  130/63 mm Hg   

 

 Pain      

 

    Pain Intensity  1     

 

 Height (Feet)  5 feet    

 

 Height (Inches)  5 inches    

 

 Height (Calculated Centimeters)  165.577254 cm    

 

 Weight (Pounds)  260 pounds    

 

 Weight (Calculated Kilograms)  117.450893 kilograms    

 

 Calculated BMI  43.26     







Results

Laboratory Results







 Test Name  Result  Units  Flags  Reference  Collection Date/Time  Result Date/
Time  Comments

 

 White Blood Count  11.9  10^3/uL  H  4.3-11.0  2015 6:50am  2015 7:
05am   

 

 Red Blood Count  4.41  10^6/uL     4.35-5.85  2015 6:50am  2015 7:
05am   

 

 Hemoglobin  13.2  G/DL     11.5-16.0  2015 6:50am  2015 7:05am   

 

 Hematocrit  38  %     35-52  2015 6:50am  2015 7:05am   

 

 Mean Corpuscular Volume  87  FL     80-99  2015 6:50am  2015 7:
05am   

 

 Mean Corpuscular Hemoglobin  30  PG     25-34  2015 6:50am  2015 7:
05am   

 

 Mean Corpuscular Hemoglobin Concent  35  G/DL     32-36  2015 6:50am   7:05am   

 

 Red Cell Distribution Width  13.7  %     10.0-14.5  2015 6:50am  2015 7:05am   

 

 Platelet Count  329  10^3/uL     130-400  2015 6:50am  2015 7:05am
   

 

 Mean Platelet Volume  9.9  FL     7.4-10.4  2015 6:50am  2015 7:
05am   

 

 Neutrophils (%) (Auto)  60  %     42-75  2015 6:50am  2015 7:05am 
  

 

 Lymphocytes (%) (Auto)  31  %     12-44  2015 6:50am  2015 7:05am 
  

 

 Monocytes (%) (Auto)  8  %     0-12  2015 6:50am  2015 7:05am   

 

 Eosinophils (%) (Auto)  1  %     0-10  2015 6:50am  2015 7:05am   

 

 Basophils (%) (Auto)  0  %     0-10  2015 6:50am  2015 7:05am   

 

 Neutrophils # (Auto)  7.2  X 10^3     1.8-7.8  2015 6:50am  2015 7:
05am   

 

 Lymphocytes # (Auto)  3.7  X 10^3     1.0-4.0  2015 6:50am  2015 7:
05am   

 

 Monocytes # (Auto)  1.0  X 10^3     0.0-1.0  2015 6:50am  2015 7:
05am   

 

 Eosinophils # (Auto)  0.1  10^3/uL     0.0-0.3  2015 6:50am  2015 7
:05am   

 

 Basophils # (Auto)  0.0  10^3/uL     0.0-0.1  2015 6:50am  2015 7:
05am   

 

 Group A Streptococcus Screen  NEGATIVE        NEGATIVE  2015 6:50am   7:10am   







Procedures

No known history of procedures.



Encounters







 Encounter  Location  Date/Time

 

 Departed Emergency Room  [facility]  05/30/15 6:28am











 Recent Diagnosis

## 2017-01-21 NOTE — ED GU-FEMALE
General


Chief Complaint:  Abdominal/GI Problems


Stated Complaint:  ABD PAIN


Nursing Triage Note:  


Pt had sono done yesterday and told she needs a D&C performed d/t miscarriage 


from last June that caused bleeding that began in December. Pt here today d/t 


abdominal pain not controlled by Tylenol/Motrin.


Nursing Sepsis Screen:  No Definite Risk


Source:  patient


Exam Limitations:  no limitations





History of Present Illness


Time seen by provider:  17:18


Initial Comments


To ER with reports of pelvic pain and cramping as well as vaginal bleeding.  She

's had used 3 pads today due to the bleeding.  She's been bleeding heavily for 

the past few days.  Today was her last day of [employer]inte[employer] birth control.  She 

had an ultrasound done yesterday for this complaint was evaluated here in the 

emergency room on Thursday.  She spoke with her primary care physician Dr. Culver who is scheduling her to see Dr. Santillan next week for a D&C.


Timing/Duration:  constant


Severity/Quality:  cramping


Location:  unknown


Associated Symptoms:   denies symptoms





Allergies and Home Medications


Allergies


Coded Allergies:  


     Penicillins (Verified  Allergy, Mild, 1/21/17)





Home Medications


Hydrocodone/Acetaminophen 1 Each Tablet #20 1 EACH PO Q4H 


   Prescribed by: MASOUD GUADARRAMA on 6/18/16 0638


Norgestimate-Ethinyl Estradiol 1 Each Tablet #28 1 TAB PO DAILY (Reported) 


Ondansetron 8 Mg Tab.rapdis #14 8 MG PO Q4H 


   Prescribed by: MASOUD GUADARRAMA on 6/18/16 0638


Phentermine HCl 37.5 Mg Capsule 37.5 MG PO DAILY (Reported) 





Constitutional:   see HPI


EENTM:   see HPI


Respiratory:   no symptoms reported


Cardiovascular:   no symptoms reported


Gastrointestinal:   abdominal pain


Genitourinary:   no symptoms reported


Musculoskeletal:   no symptoms reported


Skin:   no symptoms reported


Psychiatric/Neurological:   No Symptoms Reported


Endocrine:   No Symptoms Reported





Past Medical-Social-Family Hx


Patient Social History


Alcohol Use:  Denies Use


Recreational Drug Use:  No


Smoking Status:  Former Smoker


Type Used:  Cigarettes


Former Smoker/When Quit:  Sep 15, 2012


Recent Foreign Travel:  No


Contact w/Someone Who Travel:  No


Recent Infectious Disease Expo:  No


Recent Hopitalizations:  Yes (tonsillitis)


Physical Abuse Screen:  No


Sexual Abuse:  No





Immunizations Up To Date


Tetanus Booster (TDap):  More than 5yrs


Date of Influenza Vaccine:  Aug 4, 2016





Surgeries


HX Surgeries:  Yes (endometriosis, ovarian cyst removal, dnc, )


Surgeries:  Gallbladder





Respiratory


Hx Respiratory Disorders:  No





Cardiovascular


Hx Cardiac Disorders:  No





Neurological


Hx Neurological Disorders:  No





Reproductive System


Hx Reproductive Disorders:  Yes (DUB, OVARIAN CYSTS, SEPTUM REMOVED FROM UTERUS

; MULTIPLE MISCARRIAGES)


Sexually Transmitted Disease:  No


HIV/AIDS:  No


Female Reproductive Disorders:  Endometriosis, Ovarian Cyst





Genitourinary


Hx Genitourinary Disorders:  No





Gastrointestinal


Hx Gastrointestinal Disorders:  Yes (gallbladder removed 2011)


Gastrointestinal Disorders:  Gall Bladder Disease





Musculoskeletal


Hx Musculoskeletal Disorders:  No





Endocrine


Hx Endocrine Disorders:  No





HEENT


HX ENT Disorders:  Yes (corrective lens)


HEENT Disorders:  Tonsilitis


Loss of Vision:  Bilateral


Hearing Impairment:  Denies





Cancer


Hx Cancer:  No





Psychosocial


Hx Psychiatric Problems:  No





Integumentary


HX Skin/Integumentary Disorder:  No





Blood Transfusions


Hx Blood Disorders:  No





Family Medical History


Family Medial History:  


Asthma


  19 MOTHER


  maternal gdma


Cardiovascular disease


  maternal gdma


Completed stroke


  MATERNAL GDPA


Deafness or hearing loss


  19 MOTHER


Diabetes mellitus


  19 FATHER


Myocardial infarction


  maternal gdma


  MATERNAL GDPA


Psychosocial problem


  19 MOTHER


Respiratory disorder


  19 MOTHER


  maternal gdma


Seizure disorder


  19 MOTHER (EPILEPSY)


  G8 BROTHER (EPILEPSY)





No Family History of:


  AIDS


  Abdominal aortic aneurysm


  Morrison's disease


  Alcoholism


  Alzheimer's disease


  Aphasia


  Arthritis


  Cancer of mouth


  Cataracts


  Colon cancer


  Congenital disease


  Congenital heart disease


  Coronary thrombosis


  Cystic fibrosis


  Dementia


  Drug abuse


  Dysphasia


  Fibrocystic disease of breast


  Gastroenteritis


  Glaucoma


  Headache disorder


  Hypercholesterolemia


  Hypertension


  Infertility


  Kidney disease


  Neoplasm


  Not obtainable due to adoption


  Osteoporosis


  Parkinson's disease


  Prostate cancer


  Severe allergy


  Thyroid disease


  Tuberculosis


  Visual disorder





Physical Exam


Vital Signs





 Vital Sign - Last 12Hours








 1/21/17





 16:36


 


Temp 98.1


 


Pulse 102


 


Resp 20


 


B/P 131/91


 


Pulse Ox 99


 


O2 Delivery Room Air





Capillary Refill : Less Than 3 Seconds


General Appearance:   WD/WN no apparent distress


HEENT:   PERRL/EOMI normal ENT inspection


Neck:   non-tender full range of motion


Respiratory:   no respiratory distress no accessory muscle use


Gastrointestinal:   normal bowel sounds non tender soft


Pelvic:   normal external examNo tender w/ cervical motion


Extremities:   normal range of motion non-tender


Neurologic/Psychiatric:   alert normal mood/affect


Skin:   normal color warm/dry





Progress/Results/Core Measures


Results/Orders


Lab Results





 Laboratory Tests








Test


  1/21/17


16:55 Range/Units


 


 


Basophils # (Auto)


  0.0 


  0.0-0.1


10^3/uL


 


Basophils (%) (Auto) 0  0-10  %


 


Eosinophils # (Auto)


  0.1 


  0.0-0.3


10^3/uL


 


Eosinophils (%) (Auto) 1  0-10  %


 


Hematocrit 41  35-52  %


 


Hemoglobin 14.3  11.5-16.0  G/DL


 


Lymphocytes # (Auto) 3.0  1.0-4.0  X 10^3


 


Lymphocytes (%) (Auto) 34  12-44  %


 


Mean Corpuscular Hemoglobin 32  25-34  PG


 


Mean Corpuscular Hemoglobin


Concent 35 


  32-36  G/DL


 


 


Mean Corpuscular Volume 91  80-99  FL


 


Mean Platelet Volume 9.6  7.4-10.4  FL


 


Monocytes # (Auto) 0.6  0.0-1.0  X 10^3


 


Monocytes (%) (Auto) 7  0-12  %


 


Neutrophils # (Auto) 5.2  1.8-7.8  X 10^3


 


Neutrophils (%) (Auto) 59  42-75  %


 


Platelet Count


  328 


  130-400


10^3/uL


 


Red Blood Count


  4.49 


  4.35-5.85


10^6/uL


 


Red Cell Distribution Width 12.5  10.0-14.5  %


 


Urine Bacteria NEGATIVE   /HPF


 


Urine Bilirubin NEGATIVE  NEGATIVE  


 


Urine Casts NONE   /LPF


 


Urine Clarity


  SLIGHTLY


CLOUDY  


 


 


Urine Color YELLOW   


 


Urine Crystals NONE   /LPF


 


Urine Culture Indicated NO   


 


Urine Glucose (UA) NEGATIVE  NEGATIVE  


 


Urine Ketones NEGATIVE  NEGATIVE  


 


Urine Leukocyte Esterase 2+ H NEGATIVE  


 


Urine Mucus NEGATIVE   /LPF


 


Urine Nitrite NEGATIVE  NEGATIVE  


 


Urine Protein 1+ H NEGATIVE  


 


Urine RBC TNTC H  /HPF


 


Urine RBC (Auto) 5+ H NEGATIVE  


 


Urine Specific Gravity 1.020  1.016-1.022  


 


Urine Squamous Epithelial


Cells 5-10 


   /HPF


 


 


Urine Urobilinogen NORMAL  NORMAL  MG/DL


 


Urine WBC RARE   /HPF


 


Urine pH 6  5-9  


 


White Blood Count


  8.8 


  4.3-11.0


10^3/uL








My Orders





 Orders-DAKOTA JOSEPH


Ua Culture If Indicated (1/21/17 16:47)


Cbc And Manual Diff (1/21/17 16:47)


Comprehensive Metabolic Panel (1/21/17 16:47)


Urine Pregnancy Bedside (1/21/17 16:47)


Ketorolac Injection (Toradol Injection) (1/21/17 17:15)





Medications Given in ED





 Current Medications








 Medications  Dose


 Ordered  Sig/Agnieszka


 Route  Start Time


 Stop Time Status Last Admin


Dose Admin


 


 Ketorolac


 Tromethamine  60 mg  ONCE  ONCE


 IM  1/21/17 17:15


 1/21/17 17:16 DC 1/21/17 17:12


60 MG








Vital Signs/I&O





 Vital Sign - Last 12Hours








 1/21/17





 16:36


 


Temp 98.1


 


Pulse 102


 


Resp 20


 


B/P 131/91


 


Pulse Ox 99


 


O2 Delivery Room Air








Blood Pressure Mean:  104





Point of Care Testing


Urine Pregnancy-Bedside:  Negative





Departure


Communication


Progress Notes


1712-discussed with Dr. SANTILLAN.  He recommends progesterone 10 mg daily for 14 

days and he will follow up with her next week in the office.





Impression


Impression:  


 Primary Impression:  


 Menstrual bleeding problem


Disposition:  01 HOME, SELF-CARE


Condition:  Stable





Departure-Patient Inst.


Decision time for Depature:  17:20


Referrals:  


JOEY CULVER MD (PCP/Family)


Primary Care Physician


Patient Instructions:  Menstrual Cramps (DC)





Add. Discharge Instructions:


1.  Medication as directed.  This medication may cause dizziness, headache, 

abdominal pain and nausea


2.  Follow-up with Dr. Santillan next week as scheduled.  Call his office Monday 

morning for an appointment.  All discharge instructions reviewed with patient 

and/or family. Voiced understanding.


Scripts


Hydrocodone/Acetaminophen (Norco 5-325 Tablet)1 Each Tablet1 Each PO Q8H PRN 

PAIN #5 TAB


   Prov:DAKOTA JOSEPH APRN         1/21/17


Medroxyprogesterone Acetate 10 Mg [id number] Mg PO DAILY #13 TAB


   Prov:DAKOTA JOSEPH APRN         1/21/17








DAKOTA JOSEPH APRN Jan 21, 2017 17:21

## 2017-03-19 ENCOUNTER — HOSPITAL ENCOUNTER (EMERGENCY)
Dept: HOSPITAL 75 - ER | Age: 27
Discharge: HOME | End: 2017-03-19
Payer: MEDICAID

## 2017-03-19 VITALS — WEIGHT: 247 LBS | HEIGHT: 65 IN | BODY MASS INDEX: 41.15 KG/M2

## 2017-03-19 VITALS — SYSTOLIC BLOOD PRESSURE: 135 MMHG | DIASTOLIC BLOOD PRESSURE: 76 MMHG

## 2017-03-19 DIAGNOSIS — Z87.891: ICD-10-CM

## 2017-03-19 DIAGNOSIS — G89.18: Primary | ICD-10-CM

## 2017-03-19 PROCEDURE — 99282 EMERGENCY DEPT VISIT SF MDM: CPT

## 2017-03-19 NOTE — ED EENT
History of Present Illness


General


Chief Complaint:  Oral/Throat Problems


Stated Complaint:  POST TONSILLECTOMY PAIN


Nursing Triage Note:  


pt reports she had her tonsils out on monday by dr paul.  she reports pain is 


worsening.


Source:  patient


Exam Limitations:  no limitations





History of Present Illness


Time seen by provider:  15:01


Initial Comments


The patient is a 26-year-old white female who present with complaints of severe 

throat pain.  She [facility] by Dr. Paul on Monday.  She was given hydrocodone suspension to be taken every 4 

hours however she reports that it is not providing satisfactory relief.  There 

is no reported bleeding.


Timing/Duration:  last week


Location:  throat


Prearrival Treatment:  prescription meds





Allergies and Home Medications


Allergies


Coded Allergies:  


     Penicillins (Verified  Allergy, Mild, 1/21/17)





Home Medications


Amoxicillin 250 Mg/5 Ml Susp #150 10 ML PO BID (Reported) 


Hydrocodone/Acetaminophen 118 Ml Solution #473 15 ML PO Q4H (Reported) 





Review of Systems


Constitutional:   see HPI


Eyes:   No Symptoms Reported


Ears:   No Symptoms Reported


Nose:   no symptoms reported


Mouth:   no symptoms reported


Throat:   see HPI


Respiratory:   no symptoms reported


Cardiovascular:   no symptoms reported


Gastrointestinal:   no symptoms reported


Musculoskeletal:   no symptoms reported


Skin:   no symptoms reported


Neurological:   No Symptoms Reported


Hematologic/Lymphatic:   No Symptoms Reported


Immunological/Allergic:   no symptoms reported





Past Medical-Social-Family Hx


Patient Social History


Alcohol Use:  Denies Use


Recreational Drug Use:  No


Smoking Status:  Former Smoker


Type Used:  Cigarettes


Former Smoker/When Quit:  Sep 15, 2012


Recent Foreign Travel:  No


Contact w/Someone Who Travel:  No


Recent Infectious Disease Expo:  No


Recent Hopitalizations:  Yes (tonsillitis)





Immunizations Up To Date


Tetanus Booster (TDap):  More than 5yrs


Date of Influenza Vaccine:  Aug 4, 2016





Surgeries


HX Surgeries:  Yes (endometriosis, ovarian cyst removal, dnc, )


Surgeries:  Gallbladder, Tonsillectomy





Respiratory


Hx Respiratory Disorders:  No





Cardiovascular


Hx Cardiac Disorders:  No





Neurological


Hx Neurological Disorders:  No





Reproductive System


Hx Reproductive Disorders:  Yes (DUB, OVARIAN CYSTS, SEPTUM REMOVED FROM UTERUS

; MULTIPLE MISCARRIAGES)


Sexually Transmitted Disease:  No


HIV/AIDS:  No


Female Reproductive Disorders:  Endometriosis, Ovarian Cyst





Genitourinary


Hx Genitourinary Disorders:  No





Gastrointestinal


Hx Gastrointestinal Disorders:  Yes (gallbladder removed 2011)


Gastrointestinal Disorders:  Gall Bladder Disease





Musculoskeletal


Hx Musculoskeletal Disorders:  No





Endocrine


Hx Endocrine Disorders:  No





HEENT


HX ENT Disorders:  Yes (corrective lens)


HEENT Disorders:  Tonsilitis


Loss of Vision:  Bilateral


Hearing Impairment:  Denies





Cancer


Hx Cancer:  No





Psychosocial


Hx Psychiatric Problems:  No





Integumentary


HX Skin/Integumentary Disorder:  No





Blood Transfusions


Hx Blood Disorders:  No





Family Medical History


Family Medial History:  


Asthma


  19 MOTHER


  maternal gdma


Cardiovascular disease


  maternal gdma


Completed stroke


  MATERNAL GDPA


Deafness or hearing loss


  19 MOTHER


Diabetes mellitus


  19 FATHER


Myocardial infarction


  maternal gdma


  MATERNAL GDPA


Psychosocial problem


  19 MOTHER


Respiratory disorder


  19 MOTHER


  maternal gdma


Seizure disorder


  19 MOTHER (EPILEPSY)


  G8 BROTHER (EPILEPSY)





No Family History of:


  AIDS


  Abdominal aortic aneurysm


  Rahul's disease


  Alcoholism


  Alzheimer's disease


  Aphasia


  Arthritis


  Cancer of mouth


  Cataracts


  Colon cancer


  Congenital disease


  Congenital heart disease


  Coronary thrombosis


  Cystic fibrosis


  Dementia


  Drug abuse


  Dysphasia


  Fibrocystic disease of breast


  Gastroenteritis


  Glaucoma


  Headache disorder


  Hypercholesterolemia


  Hypertension


  Infertility


  Kidney disease


  Neoplasm


  Not obtainable due to adoption


  Osteoporosis


  Parkinson's disease


  Prostate cancer


  Severe allergy


  Thyroid disease


  Tuberculosis


  Visual disorder





Physical Exam


Vital Signs





 Vital Sign - Last 12Hours








 3/19/17





 14:14


 


Temp 97.2


 


Pulse 95


 


Resp 20


 


B/P 132/73








General Appearance:   moderate distress


Nose:   normal inspection


Mouth/Throat:   tonsillar exudate other (the tonsillar pillars are covered with 

exudate as would be expected post-tonsillectomy there is some erythema in the 

pharynx as well)


Neck:   full range of motion


Cardiovascular:   normal peripheral pulses regular rate, rhythm no edema no 

gallop no JVD no murmur


Respiratory:   chest non-tender





Progress/Results/Core Measures


Results/Orders


My Orders





 Orders-DAHLIA CLAIRE MD


Lidocaine 2% Viscous 15 Ml (Xylocaine Vi (3/19/17 15:00)


Benzocaine Extension Tube (Hurricaine Ex (3/19/17 14:58)





Medications Given in ED








 Current Medications








 Medications  Dose


 Ordered  Sig/Agnieszka


 Route  Start Time


 Stop Time Status Last Admin


Dose Admin


 


 Benzocaine  1 ea  STK-MED ONCE


 .ROUTE  3/19/17 14:58


 3/19/17 15:01 DC 3/19/17 15:00


1 EA











Vital Signs/I&O





 Vital Sign - Last 12Hours








 3/19/17





 14:14


 


Temp 97.2


 


Pulse 95


 


Resp 20


 


B/P 132/73








Blood Pressure Mean:  92





Departure


Impression


Impression:  


 Primary Impression:  


 post-tonsillectomy pain


Disposition:  01 HOME, SELF-CARE


Condition:  Improved





Departure-Patient Inst.


Referrals:  


JOEY CULVER MD (PCP/Family)


Primary Care Physician


Patient Instructions:  Tonsillectomy (DC)





Add. Discharge Instructions:


All discharge instructions reviewed with patient and/or family. Voiced 

understanding.


Continue hydrocodone as directed.


Use viscous Xylocaine and apply with swab as much as 4 times daily








DAHLIA CLAIRE MD Mar 19, 2017 15:05

## 2017-03-19 NOTE — XMS REPORT
Continuity of Care Document

 Created on: 2015



YULI SANDS

External Reference #: [id number]

: 1990

Sex: Female



Demographics







 Address  [address] [address]

 

 Home Phone  [phone]

 

 Preferred Language  English

 

 Marital Status  Unknown

 

 Latter day Affiliation  Unknown

 

 Race  Unknown

 

 Ethnic Group  Unknown





Author







 Author  [facility]

 

 Organization  [facility]

 

 Address  Unknown

 

 Phone  Unavailable







Support







 Name  Relationship  Address  Phone

 

 JOEY CULVER MD  Caregiver  [address], [address]

[place]  [address]  [phone]

 

 DAHLIA CLAIRE MD  Caregiver  [address], [address]

[place]  [address]  [phone]

 

 NOEMY SANDS  Next Of Kin  [address] [address] [phone]







Care Team Providers







 Care Team Member Name  Role  Phone

 

 JOEY CULVER MD  PCP  [phone]







Insurance Providers







 Payer Name  Policy Number  Subscriber Name  Relationship

 

 Self Pay     Yuli Sands  18 Self / Same As Patient







Advance Directives







 Directive  Response  Recorded Date/Time

 

 Advance Directives  No  05/30/15 6:31am

 

 Health Care Power of   No  05/30/15 6:31am

 

 Organ Donor  Yes  05/30/15 6:31am

 

 Resuscitation Status  Full Code  05/30/15 6:31am







Problems

Medical Problems





 Problem  Onset Date  Status

 

 Urinary tract infection in pregnancy  Unknown  Active

 

 urinary tract infection  Unknown  Active

 

 Low back pain  Unknown  Active

 

 Threatened   Unknown  Active

 

 Bacterial vaginosis  Unknown  Active

 

 Urinary tract infection in pregnancy  Unknown  Active

 

 pregnancy  Unknown  Active

 

 Influenza  Unknown  Active

 

 Viral infection  Unknown  Active







Medications







 Medication  Dose  Route  Sig  Days/Qty  Instructions  Order Date  Discontinued 
Date  Status

 

 Sertraline HCl                 07  Discontinued

 

 Loratadine                 07  Discontinued

 

 [Tri-Nesa]                 09  Discontinued

 

 Trimethoprim/Sulfamethoxazole  1 Ea  PO  TWICE A DAY  20 Qty     09  Discontinued

 

 Nitrofurantoin Macrocrystals  1 Each  PO  TWICE A DAY  30 Qty  FOR INFECTION  
09  Discontinued

 

 Naproxen  1 Each  PO  TID PRN  20 Qty     09  Discontinued

 

 Acetaminophen/Hydrocodone Bitart                 09  
Discontinued

 

 Acetaminophen/Hydrocodone Bitart  1 - 2 Each  PO  Q4HR PRN  20 Qty     09  Discontinued

 

 [Ibuprofen]                 12/23/09  06/19/10  Discontinued

 

 Amoxicillin  1 Each  PO  FOUR TIMES DAILY  40 Qty  FOR INFECTION  12/23/09  01/
15/10  Discontinued

 

 Methylprednisolone  0  PO  AS DIRECTED  1 Qty     12/23/09  01/15/10  
Discontinued

 

 Azithromycin (Zpak)  0  PO  Z-SHASTA  6 Qty     02/24/10  03/26/10  Discontinued

 

 Promethazine Hcl                 03/26/10  06/19/10  Discontinued

 

 Cephalexin Monohydrate (Keflex)  1 Each  PO  FOUR TIMES DAILY  40 Qty     03/26
/10  02/19/11  Discontinued

 

 Promethazine HCl  25 Mg  DC  EVERY 4HRS  14 Qty     03/26/10  02/19/11  
Discontinued

 

 Metronidazole (Flagyl)  1 Each  PO  THREE TIMES A DAY  30 Qty     03/31/10  02/
19/11  Discontinued

 

 Nitrofurantoin Macrocrystals  1 Each  PO  TWICE A DAY  20 Qty  FOR INFECTION  
06/19/10  02/19/11  Discontinued

 

 Naproxen  1 Each  PO  TID PRN  20 Qty     06/19/10  02/19/11  Discontinued

 

 Ergocalciferol  50,000 Unit  PO  TWICE A WEEK        09/19/10  04/23/11  
Discontinued

 

 Omeprazole                 11  Discontinued

 

 Dicyclomine Hcl  1 Each  PO  QID PRN  14 Qty     11  
Discontinued

 

 Acetaminophen/Hydrocodone Bitart (Lorcet 5/325MG)  1 - 2 Tab  PO  AS NEEDED  
28 Qty  1-2 tabs  every 4 hrs. as needed for pain.  11  
Discontinued

 

 Clarithromycin (Biaxin Xl)  2 Each  PO  DAILY        11  
Discontinued

 

 Tetracycline Hcl  500 Mg  PO  TWICE A DAY        11  
Discontinued

 

 Omeprazole  20 Mg  PO  AS NEEDED        11  Discontinued

 

 Cefprozil  1 Each  PO  TWICE A DAY  20 Qty     11  Discontinued

 

 Prednisone  40 Mg  PO  DAILY  6 Qty  FOR PAIN AND SWELLING  11
  Discontinued

 

 Prenatal Vits W-Ca,Fe,Fa(<1MG)  1 Each  PO  DAILY        11  
Discontinued

 

 [Expectra]  1 Tab  PO  DAILY        11  Discontinued

 

 Naproxen Sodium  550 Mg  PO  TWICE A DAY  30 Qty     11  
Discontinued

 

 Acetaminophen/Hydrocodone Bitart  1 - 2 Ea  PO  Q4HR PRN  30 Qty     11  Discontinued

 

 Phentermine Hcl  30 Mg  PO  DAILY        10/11/11  10/03/13  Discontinued

 

 Clindamycin HCl  1 Each  PO  GIVE EVERY 6 HR ON SCHEDULE        10/30/11  10/03
/13  Discontinued

 

 Naproxen Sodium  220 Mg  PO           10/30/11  10/03/13  Discontinued

 

 Hydrocodone Bit/Acetaminophen  1 Each  PO           10/30/11  10/03/13  
Discontinued

 

 Clindamycin HCl  2 Each  PO  THREE TIMES A DAY  7 Days     10/30/11  10/03/13  
Discontinued

 

 Folic Acid  0.4 Mg  PO  BEDTIME        10/03/13  02/12/14  Discontinued

 

 Prenatal Vits W-Ca,Fe,Fa(<1MG)  1 Tab  PO  BEDTIME        10/03/13  12/24/14  
Discontinued

 

 Acetaminophen/Hydrocodone Bitart  1 - 2 Tab  PO  q3hrs. PRN  30 Qty     10/05/
13  01/18/14  Discontinued

 

 Docosahexanoic Acid  100 Mg  PO  DAILY        14  Discontinued

 

 Trimethoprim/Sulfamethoxazole  1 Ea  PO  TWICE A DAY  14 Qty  FOR INFECTION  14  Discontinued

 

 Metronidazole  1 Appful  VG  BEDTIME  5 Qty     14  
Discontinued

 

 Acetaminophen/Hydrocodone Bitart  1 Each  PO  EVERY 4HRS PRN PAIN  6 Qty     14  Discontinued

 

 Nitrofurantoin Macrocrystals  1 Cap  PO  TWICE A DAY  14 Qty     14  Discontinued

 

 Nitrofurantoin Macrocrystals  1 Cap  PO  TWICE A DAY  14 Qty  Clcr <60 mL/
minute: Contraindicated  14  Discontinued

 

 Nitrofurantoin Macrocrystals  1 Cap  PO  TWICE A DAY  10 Qty     14  Discontinued

 

 Cephalexin Monohydrate (Keflex)  1 Each  PO  THREE TIMES A DAY  21 Qty     04/
01/14  05/10/14  Discontinued

 

 Hydrocodone Bit/Acetaminophen  1 Tab  PO  EVERY 4HRS PRN PAIN  10 Qty     14  Discontinued

 

 Ondansetron  8 Mg  PO  EVERY 6 HOURS PRN NAUSEA/VOMITING  10 Qty     14  Discontinued

 

 Docosahexanoic Acid  100 Mg  PO  DAILY        05/10/14  08/08/14  Discontinued

 

 Famotidine  20 Mg  PO  TWICE A DAY PRN P  0 Qty     05/10/14  08/08/14  
Discontinued

 

 Omeprazole  40 Mg  PO  DAILY  30 Qty     14  Discontinued

 

 [Ibuprofen]  600 Mg  PO  EVERY 8HRS For PAIN  40 Qty     14  
Discontinued

 

 Ferrous Sulfate  325 Mg  PO  TWICE A DAY  60 Qty     14  
Discontinued

 

 Acetaminophen/Hydrocodone Bitart  1 Each  PO  EVERY 4HRS For PAIN  20 Qty     
14  Discontinued

 

 Oseltamivir Phosphate (Tamiflu)  75 Mg  PO  TWICE A DAY  10 Qty     12/24/14  
05/30/15  Discontinued

 

 Levofloxacin  750 Mg  PO  DAILY  5 Qty     12/24/14  05/30/15  Discontinued







Social History







 Social History Problem  Response  Recorded Date/Time

 

 Alcohol Use  Denies Use  2015 6:31am

 

 Recreational Drug Use  No  2015 6:31am

 

 Recent Foreign Travel  No  09/15/2014 6:47am

 

 Recent Infectious Disease Exposure  No  09/15/2014 6:47am

 

 Hospitalization with Isolation  Denies  2015 6:31am

 

 Sexually Transmitted Disease  No  2015 6:31am

 

 HIV/AIDS  No  2015 6:31am

 

 Smoking Status  Never a Smoker  2015 6:31am









 Query  Response  Start Date  Stop Date

 

 Smoking Status  Never a Smoker      







Hospital Discharge Instructions

No hospital discharge instructions.



Plan of Care

No plan of care.



Functional Status

No functional status results.



Allergies, Adverse Reactions, Alerts







 Allergen  Type  Severity  Reaction  Status  Last Updated

 

 No Known Drug Allergies           Active  14







Immunizations







 Name  Given  Type

 

 Date of Influenza Vaccine  14  Historical

 

 Hepatitis A  No  Historical

 

 Hepatitis B  Yes  Historical

 

 Tetanus Booster (TDap)  More than 5yrs  Historical







Vital Signs

Acute Vital Signs





 Vital  Response  Date/Time

 

 Temperature (Fahrenheit)  97.6 degrees F (97.6 - 99.5)   

 

 Temperature (Calculated Celsius)  36.63207 degrees C (36.4 - 37.5)   

 

 Pulse Rate (adult)  97 bpm (60 - 90)   

 

 Respiratory Rate  18 bpm (12 - 24)   

 

 O2 Sat by Pulse Oximetry  96 % (88 - 100)   

 

 Blood Pressure  130/63 mm Hg   

 

 Pain      

 

    Pain Intensity  1     

 

 Height (Feet)  5 feet    

 

 Height (Inches)  5 inches    

 

 Height (Calculated Centimeters)  165.072935 cm    

 

 Weight (Pounds)  260 pounds    

 

 Weight (Calculated Kilograms)  117.341787 kilograms    

 

 Calculated BMI  43.26     







Results

Laboratory Results







 Test Name  Result  Units  Flags  Reference  Collection Date/Time  Result Date/
Time  Comments

 

 White Blood Count  11.9  10^3/uL  H  4.3-11.0  2015 6:50am  2015 7:
05am   

 

 Red Blood Count  4.41  10^6/uL     4.35-5.85  2015 6:50am  2015 7:
05am   

 

 Hemoglobin  13.2  G/DL     11.5-16.0  2015 6:50am  2015 7:05am   

 

 Hematocrit  38  %     35-52  2015 6:50am  2015 7:05am   

 

 Mean Corpuscular Volume  87  FL     80-99  2015 6:50am  2015 7:
05am   

 

 Mean Corpuscular Hemoglobin  30  PG     25-34  2015 6:50am  2015 7:
05am   

 

 Mean Corpuscular Hemoglobin Concent  35  G/DL     32-36  2015 6:50am   7:05am   

 

 Red Cell Distribution Width  13.7  %     10.0-14.5  2015 6:50am  2015 7:05am   

 

 Platelet Count  329  10^3/uL     130-400  2015 6:50am  2015 7:05am
   

 

 Mean Platelet Volume  9.9  FL     7.4-10.4  2015 6:50am  2015 7:
05am   

 

 Neutrophils (%) (Auto)  60  %     42-75  2015 6:50am  2015 7:05am 
  

 

 Lymphocytes (%) (Auto)  31  %     12-44  2015 6:50am  2015 7:05am 
  

 

 Monocytes (%) (Auto)  8  %     0-12  2015 6:50am  2015 7:05am   

 

 Eosinophils (%) (Auto)  1  %     0-10  2015 6:50am  2015 7:05am   

 

 Basophils (%) (Auto)  0  %     0-10  2015 6:50am  2015 7:05am   

 

 Neutrophils # (Auto)  7.2  X 10^3     1.8-7.8  2015 6:50am  2015 7:
05am   

 

 Lymphocytes # (Auto)  3.7  X 10^3     1.0-4.0  2015 6:50am  2015 7:
05am   

 

 Monocytes # (Auto)  1.0  X 10^3     0.0-1.0  2015 6:50am  2015 7:
05am   

 

 Eosinophils # (Auto)  0.1  10^3/uL     0.0-0.3  2015 6:50am  2015 7
:05am   

 

 Basophils # (Auto)  0.0  10^3/uL     0.0-0.1  2015 6:50am  2015 7:
05am   

 

 Group A Streptococcus Screen  NEGATIVE        NEGATIVE  2015 6:50am   7:10am   







Procedures

No known history of procedures.



Encounters







 Encounter  Location  Date/Time

 

 Departed Emergency Room  [facility]  05/30/15 6:28am











 Recent Diagnosis

## 2017-05-18 ENCOUNTER — HOSPITAL ENCOUNTER (OUTPATIENT)
Dept: HOSPITAL 75 - SLEEP | Age: 27
LOS: 1 days | Discharge: HOME | End: 2017-05-19
Attending: NURSE PRACTITIONER
Payer: MEDICAID

## 2017-05-18 DIAGNOSIS — G47.10: ICD-10-CM

## 2017-05-18 DIAGNOSIS — G47.33: Primary | ICD-10-CM

## 2017-05-18 PROCEDURE — 95810 POLYSOM 6/> YRS 4/> PARAM: CPT

## 2017-06-26 ENCOUNTER — HOSPITAL ENCOUNTER (EMERGENCY)
Dept: HOSPITAL 75 - ER | Age: 27
Discharge: HOME | End: 2017-06-26
Payer: MEDICAID

## 2017-06-26 VITALS — SYSTOLIC BLOOD PRESSURE: 117 MMHG | DIASTOLIC BLOOD PRESSURE: 44 MMHG

## 2017-06-26 VITALS — HEIGHT: 65 IN | WEIGHT: 242 LBS | BODY MASS INDEX: 40.32 KG/M2

## 2017-06-26 DIAGNOSIS — Z88.2: ICD-10-CM

## 2017-06-26 DIAGNOSIS — Z3A.01: ICD-10-CM

## 2017-06-26 DIAGNOSIS — R10.32: ICD-10-CM

## 2017-06-26 DIAGNOSIS — O9A.211: Primary | ICD-10-CM

## 2017-06-26 DIAGNOSIS — Z87.891: ICD-10-CM

## 2017-06-26 DIAGNOSIS — Z87.42: ICD-10-CM

## 2017-06-26 LAB
BILIRUB UR QL STRIP: NEGATIVE
KETONES UR QL STRIP: NEGATIVE
LEUKOCYTE ESTERASE UR QL STRIP: (no result)
NITRITE UR QL STRIP: NEGATIVE
PH UR STRIP: 6 [PH] (ref 5–9)
PROT UR QL STRIP: NEGATIVE
SP GR UR STRIP: 1.02 (ref 1.02–1.02)
SQUAMOUS #/AREA URNS HPF: (no result) /HPF
UROBILINOGEN UR-MCNC: NORMAL MG/DL
WBC #/AREA URNS HPF: (no result) /HPF

## 2017-06-26 PROCEDURE — 36415 COLL VENOUS BLD VENIPUNCTURE: CPT

## 2017-06-26 PROCEDURE — 99283 EMERGENCY DEPT VISIT LOW MDM: CPT

## 2017-06-26 PROCEDURE — 84702 CHORIONIC GONADOTROPIN TEST: CPT

## 2017-06-26 PROCEDURE — 76817 TRANSVAGINAL US OBSTETRIC: CPT

## 2017-06-26 PROCEDURE — 81000 URINALYSIS NONAUTO W/SCOPE: CPT
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
## 2017-06-26 NOTE — XMS REPORT
Continuity of Care Document

 Created on: 2017



GENAOR JORDAN

External Reference #: [id number]

: 1990

Sex: Female



Demographics







 Address  [address]

[place]  [address]

 

 Home Phone  [phone]

 

 Preferred Language  Unknown

 

 Marital Status  Unknown

 

 [employer] Affiliation  Unknown

 

 Race  Unknown

 

 Ethnic Group  Unknown





Author







 Author  [facility]

 

 Address  Unknown

 

 Phone  Unavailable



                                                      



Allergies

                      





 Active                    Description                    Code                  
  Type                    Severity                    Reaction                  
  Onset                    Reported/Identified                    Relationship 
to Patient                    Clinical Status                

 

 Yes                    Penicillins                                         
Drug Allergy                                                                   
                2010                                                     
     

 

 Yes                    Penicillins                                         
Drug Allergy                    N/A                    N/A                     
                    2010                                                 
         

 

 Yes                    No Known Drug Allergies                    [id number]  
                  Drug Allergy                    Unknown                    N/
A                                         2014                           
                               

 

 Yes                    Penicillins                    [id number]              
      Drug Allergy                    Mild                    N/A              
                           2017                                          
                



                                                                               
                                       



Medications

                                                                               
         



Problems

                      





 Date Dx Coded                    Attending                    Type            
        Code                    Diagnosis                    Diagnosed By      
          

 

 2010                                                              626.4 
                   IRREGULAR MENSTRUAL CYCLE                                   
  

 

 2010                                                              780.4 
                   DIZZINESS AND GIDDINESS                                     

 

 2010                                                              780.79
                    OTHER MALAISE AND FATIGUE                                  
   

 

 2010                                                              783.1 
                   ABNORMAL WEIGHT GAIN                                     

 

 2010                                                              784.0 
                   HEADACHE                                     

 

 2010                    LETICIA SORENSON R                       
                  626.4                    IRREGULAR MENSTRUAL CYCLE           
                          

 

 2010                    LUMA SORENSONIA R                       
                  780.4                    DIZZINESS AND GIDDINESS             
                        

 

 2010                    LUMA SORENSONIA R                       
                  780.79                    OTHER MALAISE AND FATIGUE          
                           

 

 2010                    LUMA SORENSONIA R                       
                  783.1                    ABNORMAL WEIGHT GAIN                
                     

 

 2010                    LUMA SORENSONIA R                       
                  784.0                    HEADACHE                            
         

 

 2010                                                              268.9 
                   VITAMIN D DEFICIENCY                                     

 

 2010                    LETICIA SORENSON R                       
                  268.9                    VITAMIN D DEFICIENCY                
                     

 

 2010                                                              386.11
                    BENIGN PAROXYSMAL POSITIONAL VERTIGO                       
              

 

 2010                                                              780.52
                    INSOMNIA, UNSPECIFIED                                     

 

 2010                    LUMA SORENSONIA R                       
                  386.11                    BENIGN PAROXYSMAL POSITIONAL 
VERTIGO                                     

 

 2010                    LETICIA SORENSON R                       
                  780.52                    INSOMNIA, UNSPECIFIED              
                       

 

 2010                                                              278.01
                    OBESITY MORBID BMI >40                                     

 

 2010                                                              463   
                 TONSILLITIS ACUTE                                     

 

 2010                    LETICIA SORENSON R                       
                  278.01                    OBESITY MORBID BMI >40             
                        

 

 2010                    LETICIA SORENSON R                       
                  463                    TONSILLITIS ACUTE                     
                

 

 10/26/2010                                                              V25.41
                    SURVEILLANCE OF CONTRACEPTIVE PILL                         
            

 

 10/26/2010                    LETICIA SORENSON R                       
                  V25.41                    SURVEILLANCE OF CONTRACEPTIVE PILL 
                                    

 

 2011                                                              305.1 
                   NONDEPENDENT TOBACCO USE DISORDER                           
          

 

 2011                                                              789.66
                    ABDOMINAL TENDERNESS EPIGASTRIC                            
         

 

 2011                    LETICIA SORENSON R                       
                  305.1                    NONDEPENDENT TOBACCO USE DISORDER   
                                  

 

 2011                    LETICIA SORENSON R                       
                  789.66                    ABDOMINAL TENDERNESS EPIGASTRIC    
                                 

 

 2011                                                              041.86
                    HELICOBACTER PYLORI [H. PYLORI] INFECTION                  
                   

 

 2011                                                              571.8 
                   OTHER CHRONIC NONALCOHOLIC LIVER DISEASE                    
                 

 

 2011                                                              789.00
                    ABDOMINAL PAIN UNSPECIFIED SITE                            
         

 

 2011                    LETICIA SORENSON R                       
                  041.86                    HELICOBACTER PYLORI [H. PYLORI] 
INFECTION                                     

 

 2011                    LETICIA SORENSON R                       
                  571.8                    OTHER CHRONIC NONALCOHOLIC LIVER 
DISEASE                                     

 

 2011                    LETICIA SORENSON R                       
                  789.00                    ABDOMINAL PAIN UNSPECIFIED SITE    
                                 

 

 2011                                                              616.10
                    VAGINITIS VULVOVAGINITIS UNSPECIFIED                       
              

 

 2011                                                              V72.31
                    GYN EXAM, ROUTINE                                     

 

 2011                    LETICIA SORENSON R                       
                  616.10                    VAGINITIS VULVOVAGINITIS 
UNSPECIFIED                                     

 

 2011                    LETICIA SORENSON R                       
                  V72.31                    GYN EXAM, ROUTINE                  
                   

 

 2011                                         Ot                    462  
                  ACUTE PHARYNGITIS                                     

 

 2011                                                              V72.42
                    PREGNANCY TEST POSITIVE RESULT                             
        

 

 2011                    LETICIA SORENSON R                       
                  V72.42                    PREGNANCY TEST POSITIVE RESULT     
                                

 

 2011                                         Ot                    462  
                  ACUTE PHARYNGITIS                                     

 

 2011                                         Ot                    
648.93                    OTH CURR COND-ANTEPARTUM                             
        

 

 2011                                         Ot                    599.0
                    URIN TRACT INFECTION NOS                                   
  

 

 2011                                         Ot                    
640.03                    THREATEN ABORT-ANTEPART                              
       

 

 2011                                         Ot                    
646.63                     INFECTION-ANTEPARTUM                              
       

 

 2011                                         Ot                    
789.04                    ABDOMINAL PAIN, LEFT LOWER QUADRANT                  
                   

 

 2011                                         Ot                    623.8
                    NONINFLAM DIS VAGINA NEC                                   
  

 

 2011                                         Ot                    
640.03                    THREATEN ABORT-ANTEPART                              
       

 

 2011                                         Ot                    
640.03                    THREATEN ABORT-ANTEPART                              
       

 

 06/10/2011                                         Ot                    623.8
                    NONINFLAM DIS VAGINA NEC                                   
  

 

 06/10/2011                                         Ot                    
654.73                    ABNORM VAGINA-ANTEPARTUM                             
        

 

 2011                                         Ot                    
634.91                    SPON ABORT UNCOMPL-INC                               
      

 

 2011                                         Ot                    625.3
                    DYSMENORRHEA                                     

 

 2011                                         Ot                    625.9
                    FEM GENITAL SYMPTOMS NOS                                   
  

 

 2011                                                              625.9 
                   PELVIC PAIN                                     

 

 2011                                                              626.8 
                   DYSFUNCTIONAL UTERINE BLEEDING                              
       

 

 2011                    LETICIA SORENSON R                       
                  625.9                    PELVIC PAIN                         
            

 

 2011                    LETICIA SORENSON R                       
                  626.8                    DYSFUNCTIONAL UTERINE BLEEDING      
                               

 

 10/30/2011                                         Ot                    462  
                  ACUTE PHARYNGITIS                                     

 

 2013                    LETICIA SORENSON R                       
                  462                    sore throat                           
          

 

 2013                    LETICIA SORENSON R                       
                  786.2                    cough                               
      

 

 10/05/2013                    ANAIS MIRAMONTES MD                    Ot 
                   256.4                    POLYCYSTIC OVARIES                 
                    

 

 10/05/2013                    ANAIS MIRAMONTES MD                    Ot 
                   625.8                    FEM GENITAL SYMPTOMS NEC           
                          

 

 10/05/2013                    ANAIS MIRAMONTES MD                    Ot 
                   626.8                    MENSTRUAL DISORDER NEC             
                        

 

 10/05/2013                    ANAIS MIRAMONTES MD                    Ot 
                   752.35                    SEPTATE UTERUS                    
                 

 

 2014                    DAHLIA CLAIRE MD                    Ot       
             599.0                    URIN TRACT INFECTION NOS                 
                    

 

 2014                    DAHLIA CLAIRE MD                    Ot       
             646.63                     INFECTION-ANTEPARTUM                 
                    

 

 2014                    DAHLIA CLAIRE MD                    Ot       
             789.09                    ABDOMINAL PAIN, OTHER SPECIFIED SITE    
                                 

 

 2014                    PADMINI BOATENG MD                    Ot    
                623.8                    NONINFLAM DIS VAGINA NEC              
                       

 

 2014                    PADMINI BOATENG MD                    Ot    
                640.03                    THREATEN ABORT-ANTEPART              
                       

 

 2014                    PADMINI BOATENG MD                    Ot    
                648.73                    BONE DISORDER-ANTEPARTUM             
                        

 

 2014                    PADMINI BOATENG MD                    Ot    
                724.2                    LUMBAGO                               
      

 

 2014                    SRIDHAR JAVIER                    Ot     
               599.0                    URIN TRACT INFECTION NOS               
                      

 

 2014                    SRIDHAR JAVIER                    Ot     
               616.10                    VAGINITIS NOS                         
            

 

 2014                    SRIDHAR JAVIER                    Ot     
               623.5                    NONINFECT VAG LEUKORRHEA               
                      

 

 2014                    JANEL PA, SRIDHAR L                    Ot     
               646.63                     INFECTION-ANTEPARTUM               
                      

 

 2014                    DAKOTA JOSEPH APRN                    Ot       
             599.0                    URIN TRACT INFECTION NOS                 
                    

 

 2014                    DAKOTA JOSEPH APRN                    Ot       
             641.13                    PLACEN PREV HEM-ANTEPART                
                     

 

 2014                    DAKOTA JOSEPH APRN                    Ot       
             646.63                     INFECTION-ANTEPARTUM                 
                    

 

 2014                    DAKOTA JOSEPH APRN                    Ot       
             649.53                    SPOTTING COMP PREGNANCY, ANTEPARTUM COND
                                     

 

 2014                    SRIDHAR JAVIER                    Ot     
               599.0                    URIN TRACT INFECTION NOS               
                      

 

 2014                    SRIDHAR JAVIER                    Ot     
               623.8                    NONINFLAM DIS VAGINA NEC               
                      

 

 2014                    SRIDHAR JAVIER                    Ot     
               640.93                    HEM EARLY PREG-ANTEPART               
                      

 

 2014                    SRIDHAR JAVIER                    Ot     
               646.63                     INFECTION-ANTEPARTUM               
                      

 

 05/10/2014                    JOEY CULVER MD                    Ot        
            648.73                    BONE DISORDER-ANTEPARTUM                 
                    

 

 05/10/2014                    JOEY CULVER MD                    Ot        
            724.5                    BACKACHE NOS                              
       

 

 2014                    JOEY CULVER MD                    Ot        
            646.83                    PREG COMPL NEC-ANTEPART                  
                   

 

 2014                    JOEY CULVER MD                    Ot        
            789.06                    ABDOMINAL PAIN, EPIGASTRIC               
                      

 

 2014                    MEL DINH MARIAMA LEIGHA                    Ot          
          655.73                    DECR FETAL MOVEMNT ANTEPARTUM CONDITION    
                                  

 

 2014                    JOEY CULVER MD                    Ot        
            285.1                    AC POSTHEMORRHAG ANEMIA                   
                  

 

 2014                    JOEY CULVER MD                    Ot        
            648.22                    ANEMIA-DELIVERED W P/P                   
                  

 

 2014                    JOEY CULVER MD                    Ot        
            666.02                    THRD-STAGE HEM-DEL W P/P                 
                    

 

 2014                    JOEY CULVER MD                    Ot        
            666.12                    POSTPA HEM NEC-DEL W P/P                 
                    

 

 2014                    JOEY CULVER MD                    Ot        
            V06.1                    DIPHTHERIA-TETANUS-PERTUSSIS, COMBINED [  
                                   

 

 2014                    JOEY CULVER MD                    Ot        
            V27.0                    DELIVER-SINGLE LIVEBORN                   
                  

 

 2014                    SYED MORSE MD                    Ot        
            487.1                    FLU W RESP MANIFEST NEC                   
                  

 

 2014                    SYED MORSE MD                    Ot        
            780.60                    FEVER, UNSPECIFIED                       
              

 

 2014                                         Ot                    
646.83                                                          

 

 2014                                         Ot                    
789.04                                                          

 

 2014                                         Ot                    
640.93                                                          

 

 2014                                         Ot                    620.2
                                                          

 

 2014                                         Ot                    626.8
                                                          

 

 2014                                         Ot                    
629.81                                                          

 

 2014                                         Ot                    793.5
                                                          

 

 2014                                         Ot                    626.2
                                                          

 

 2014                                         Ot                    
V67.09                                                          

 

 2014                                         Ot                    625.9
                                                          

 

 2014                    ABIOLA SO, JOEY SUAREZ                    Ot        
            620.2                                                          

 

 2014                    ABIOLA SO, JOEY SUAREZ                    Ot        
            626.8                                                          

 

 2014                    FE SO, ANAIS HEAD                    Ot 
                   285.9                                                       
   

 

 2014                    FE SO, ANAIS HEAD                    Ot 
                   620.2                                                       
   

 

 2014                    FE SO, ANAIS HEAD                    Ot 
                   626.8                                                       
   

 

 2014                    FE SO, ANAIS HEAD                    Ot 
                   V72.63                                                      
    

 

 2014                    FE SO, ANAIS HEAD                    Ot 
                   V74.8                                                       
   

 

 2015                    ALETHEA SO, DAHLIA AHUJA                    Ot       
             079.99                    VIRAL INFECTION NOS                     
                

 

 2015                    ALETHEA SO, DAHLIA AHUJA                    Ot       
             462                    ACUTE PHARYNGITIS                          
           

 

 2015                    MASOUD GUADARRAMA DO                    Ot           
         462                    ACUTE PHARYNGITIS                              
       

 

 2015                    MASOUD GUAADRRAMA DO                    Ot           
         465.9                    ACUTE URI NOS                                
     

 

 2015                    JOEY CULVER MD                    Ot        
            V28.81                                                          

 

 2015                    JOEY CULVER MD                    Ot        
            V28.81                                                          

 

 2015                    JOEY CULVER MD                    Ot        
            666.24                                                          

 

 2015                    DAKOTA JOSEPH                    Ot       
             L03.031                    CELLULITIS OF RIGHT TOE                
                     

 

 2015                    DAKOTA JOSEPH APRN                    Ot       
             L60.0                    INGROWING NAIL                           
          

 

 2015                    MASOUD GUADARRAMA DO                    Ot           
         L03.031                    CELLULITIS OF RIGHT TOE                    
                 

 

 2015                    GAYLE SO, BENTLEY RAMÍREZ                    Ot        
            N97.9                                                          

 

 2015                    GAYLE SO, BENTLEY RAMÍREZ                    Ot        
            N97.9                                                          

 

 2016                                         Ot                    
646.83                    PREG COMPL NEC-ANTEPART                              
       

 

 2016                                         Ot                    
789.04                    ABDOMINAL PAIN, LEFT LOWER QUADRANT                  
                   

 

 2016                                         Ot                    
640.93                    HEM EARLY PREG-ANTEPART                              
       

 

 2016                                         Ot                    620.2
                    OVARIAN CYST NEC/NOS                                     

 

 2016                                         Ot                    626.8
                    MENSTRUAL DISORDER NEC                                     

 

 2016                                         Ot                    
629.81                    RECURRENT PREGNANCY LOSS WITHOUT CURRENT             
                        

 

 2016                                         Ot                    793.5
                    NOSP (ABN) FINDINGS ON RADIOLOGICAL   OT                   
                  

 

 2016                                         Ot                    626.2
                    EXCESSIVE MENSTRUATION                                     

 

 2016                                         Ot                    
V67.09                    SURGERY FOLLOW-UP, OTHER SURGERY                     
                

 

 2016                                         Ot                    625.9
                    FEM GENITAL SYMPTOMS NOS                                   
  

 

 2016                    JOEY CULVER MD                    Ot        
            620.2                    OVARIAN CYST NEC/NOS                      
               

 

 2016                    JOEY CULVER MD                    Ot        
            626.8                    MENSTRUAL DISORDER NEC                    
                 

 

 2016                    FE SO, ANAIS HEAD                    Ot 
                   285.9                    ANEMIA NOS                         
            

 

 2016                    ANAIS MIRAMONTES MD                    Ot 
                   620.2                    OVARIAN CYST NEC/NOS               
                      

 

 2016                    ANAIS MIRAMONTES MD                    Ot 
                   626.8                    MENSTRUAL DISORDER NEC             
                        

 

 2016                    FE SO, ANAIS HEAD                    Ot 
                   V72.63                    PRE-PROCEDURAL LABORATORY 
EXAMINATION                                     

 

 2016                    ANAIS MIRAMONTES MD                    Ot 
                   V74.8                    SCREEN-BACTERIAL DIS NEC           
                          

 

 2016                    GAYLE SO, BENTLEY RAMÍREZ                    Ot        
            N97.9                    FEMALE INFERTILITY, UNSPECIFIED           
                          

 

 2016                    THIERRY DINH MASOUD LEIGHA                    Ot           
         J02.9                    ACUTE PHARYNGITIS, UNSPECIFIED               
                      

 

 2016                                         Ot                    
646.83                    PREG COMPL NEC-ANTEPART                              
       

 

 2016                                         Ot                    
789.04                    ABDOMINAL PAIN, LEFT LOWER QUADRANT                  
                   

 

 2016                                         Ot                    
640.93                    HEM EARLY PREG-ANTEPART                              
       

 

 2016                                         Ot                    620.2
                    OVARIAN CYST NEC/NOS                                     

 

 2016                                         Ot                    626.8
                    MENSTRUAL DISORDER NEC                                     

 

 2016                                         Ot                    
629.81                    RECURRENT PREGNANCY LOSS WITHOUT CURRENT             
                        

 

 2016                                         Ot                    793.5
                    NOSP (ABN) FINDINGS ON RADIOLOGICAL   OT                   
                  

 

 2016                                         Ot                    626.2
                    EXCESSIVE MENSTRUATION                                     

 

 2016                                         Ot                    
V67.09                    SURGERY FOLLOW-UP, OTHER SURGERY                     
                

 

 2016                                         Ot                    625.9
                    FEM GENITAL SYMPTOMS NOS                                   
  

 

 2016                    JOEY CULVER MD                    Ot        
            620.2                    OVARIAN CYST NEC/NOS                      
               

 

 2016                    JOEY CULVER MD                    Ot        
            626.8                    MENSTRUAL DISORDER NEC                    
                 

 

 2016                    ANAIS MIRAMONTES MD                    Ot 
                   285.9                    ANEMIA NOS                         
            

 

 2016                    ANAIS MIRAMONTES MD                    Ot 
                   620.2                    OVARIAN CYST NEC/NOS               
                      

 

 2016                    ANAIS MIRAMONTES MD                    Ot 
                   626.8                    MENSTRUAL DISORDER NEC             
                        

 

 2016                    ANAIS MIRAMONTES MD, Ot 
                   V72.63                    PRE-PROCEDURAL LABORATORY 
EXAMINATION                                     

 

 2016                    ANAIS MIRAMONTES MD, Ot 
                   V74.8                    SCREEN-BACTERIAL DIS NEC           
                          

 

 2016                    GAYLE SO, BENTLEY RAMÍREZ                    Ot        
            N97.9                    FEMALE INFERTILITY, UNSPECIFIED           
                          

 

 2016                    MASOUD GUADARRAMA DO                    Ot           
         J02.9                    ACUTE PHARYNGITIS, UNSPECIFIED               
                      

 

 2016                    MASOUD GUADARRAMA DO                    Ot           
         O20.0                    THREATENED                           
           

 

 2016                    MASOUD GUADARRAMA DO                    Ot           
         Z3A.01                    LESS THAN 8 WEEKS GESTATION OF PREGNANCY    
                                 

 

 2016                    ABIOLA SO, JOEY SUAREZ                    Ot        
            V28.81                    ENCOUNTER FOR FETAL ANATOMIC SURVEY      
                               

 

 2016                    JOEY CULVER MD, Ot        
            V28.81                    ENCOUNTER FOR FETAL ANATOMIC SURVEY      
                               

 

 2016                    JOEY CULVER MD                    Ot        
            666.24                    DELAY P/PART HEM-POSTPAR                 
                    

 

 2016                                         Ot                    
646.83                    PREG COMPL NEC-ANTEPART                              
       

 

 2016                                         Ot                    
789.04                    ABDOMINAL PAIN, LEFT LOWER QUADRANT                  
                   

 

 2016                                         Ot                    
640.93                    HEM EARLY PREG-ANTEPART                              
       

 

 2016                                         Ot                    620.2
                    OVARIAN CYST NEC/NOS                                     

 

 2016                                         Ot                    626.8
                    MENSTRUAL DISORDER NEC                                     

 

 2016                                         Ot                    
629.81                    RECURRENT PREGNANCY LOSS WITHOUT CURRENT             
                        

 

 2016                                         Ot                    793.5
                    NOSP (ABN) FINDINGS ON RADIOLOGICAL   OT                   
                  

 

 2016                                         Ot                    626.2
                    EXCESSIVE MENSTRUATION                                     

 

 2016                                         Ot                    
V67.09                    SURGERY FOLLOW-UP, OTHER SURGERY                     
                

 

 2016                                         Ot                    625.9
                    FEM GENITAL SYMPTOMS NOS                                   
  

 

 2016                    JOEY CULVER MD                    Ot        
            620.2                    OVARIAN CYST NEC/NOS                      
               

 

 2016                    JOEY CULVER MD                    Ot        
            626.8                    MENSTRUAL DISORDER NEC                    
                 

 

 2016                    ANAIS MIRAMONTES MD                    Ot 
                   285.9                    ANEMIA NOS                         
            

 

 2016                    ANAIS MIRAMONTES MD                    Ot 
                   620.2                    OVARIAN CYST NEC/NOS               
                      

 

 2016                    ANAIS MIRAMONTES MD                    Ot 
                   626.8                    MENSTRUAL DISORDER NEC             
                        

 

 2016                    AANIS MIRAMONTES MD                    Ot 
                   V72.63                    PRE-PROCEDURAL LABORATORY 
EXAMINATION                                     

 

 2016                    ANAIS MIRAMONTES MD, Ot 
                   V74.8                    SCREEN-BACTERIAL DIS NEC           
                          

 

 2016                    BENTLEY ARAUJO MD                    Ot        
            N97.9                    FEMALE INFERTILITY, UNSPECIFIED           
                          

 

 2016                    BETNLEY ARAUJO MD                    Ot        
            N97.9                    FEMALE INFERTILITY, UNSPECIFIED           
                          

 

 2016                    THIERRY  MASOUD K                    Ot           
         O20.0                    THREATENED                           
           

 

 2016                    THIERRY MASOUD                    Ot           
         Z3A.01                    LESS THAN 8 WEEKS GESTATION OF PREGNANCY    
                                 

 

 2017                    DAHLIA CLAIRE MD                    Ot       
             N92.0                    EXCESSIVE AND FREQUENT MENSTRUATION WITH 
                                    

 

 2017                    DAHLIA CLAIRE MD                    Ot       
             N93.9                    ABNORMAL UTERINE AND VAGINAL BLEEDING, U 
                                    

 

 2017                    DAHLIA CLAIRE MD                    Ot       
             N92.0                    EXCESSIVE AND FREQUENT MENSTRUATION WITH 
                                    

 

 2017                    DAHLIA CLAIRE MD                    Ot       
             N93.9                    ABNORMAL UTERINE AND VAGINAL BLEEDING, U 
                                    

 

 2017                    DAKOTA JOSEPH                    Ot       
             N93.8                    OTHER SPECIFIED ABNORMAL UTERINE AND VAG 
                                    

 

 2017                    DAKOTA JOSEPH                    Ot       
             R10.30                    LOWER ABDOMINAL PAIN, UNSPECIFIED       
                              

 

 2017                    JOEY CULVER MD                    Ot        
            N93.9                    ABNORMAL UTERINE AND VAGINAL BLEEDING, U  
                                   

 

 2017                    JOEY CULVER MD                    Ot        
            N93.9                    ABNORMAL UTERINE AND VAGINAL BLEEDING, U  
                                   

 

 2017                    DAHLIA CLAIRE MD                    Ot       
             G89.18                    OTHER ACUTE POSTPROCEDURAL PAIN         
                            

 

 2017                    DAHLIA CLAIRE MD                    Ot       
             Z87.891                    PERSONAL HISTORY OF NICOTINE DEPENDENCE
                                     

 

 2017                    DHALIA CLAIRE MD                    Ot       
             G89.18                    OTHER ACUTE POSTPROCEDURAL PAIN         
                            

 

 2017                    DAHLIA CLAIRE MD                    Ot       
             Z87.891                    PERSONAL HISTORY OF NICOTINE DEPENDENCE
                                     

 

 2017                    LOY JOYCE FNP                    Ot     
               G47.10                    HYPERSOMNIA, UNSPECIFIED              
                       

 

 2017                    LOY JOYCE [facility]                    Ot     
               G47.33                    OBSTRUCTIVE SLEEP APNEA (ADULT) (
PEDIATR                                     



                                                                               
                                                                               
                                                                               
                                                                               
                                                                               
                                                                               
                                                                               
                                                                               
                                                                               
                                                                               
                                                                               
                                                                               
                                                                               
                                                                               
                                                                               
                                                                               
                                                                               
                                                                               
                                                                               
                                                                               
                                                                               
                                                                               
                                                                               
                                                                               
                                                                               
                                                                               
                                                      



Procedures

                      





 Code                    Description                    Performed By           
         Performed On                

 

                     [id number]                                                     
     STREP A  (IN-HOUSE)                                                       
    2013                

 

                     73.4                                                      
                                                               09/15/2014      
          

 

                     73.6                                                      
                                                               09/15/2014      
          

 

                     75.7                                                      
                                                               09/15/2014      
          



                                                                               
                                       



Results

                      





 Test                    Result                    Range                









 Complete blood count (CBC) with automated white blood cell (WBC) differential 
- 17 06:50                









 Blood leukocytes automated count (number/volume)                    7.2 10*3/
uL                    4.3-11.0                

 

 Blood erythrocytes automated count (number/volume)                    4.23 10*6
/uL                    4.35-5.85                

 

 Venous blood hemoglobin measurement (mass/volume)                    13.6 g/dL
                    11.5-16.0                

 

 Blood hematocrit (volume fraction)                    39 %                    
35-52                

 

 Automated erythrocyte mean corpuscular volume                    92 [foz_us]  
                  80-99                

 

 Automated erythrocyte mean corpuscular hemoglobin (mass per erythrocyte)      
              32 pg                    25-34                

 

 Automated erythrocyte mean corpuscular hemoglobin concentration measurement (
mass/volume)                    35 g/dL                    32-36                

 

 Automated erythrocyte distribution width ratio                    12.8 %      
              10.0-14.5                

 

 Automated blood platelet count (count/volume)                    305 10*3/uL  
                  130-400                

 

 Automated blood platelet mean volume measurement                    9.4 [foz_us
]                    7.4-10.4                

 

 Automated blood neutrophils/100 leukocytes                    52 %            
        42-75                

 

 Automated blood lymphocytes/100 leukocytes                    39 %            
        12-44                

 

 Blood monocytes/100 leukocytes                    8 %                    0-12 
               

 

 Automated blood eosinophils/100 leukocytes                    1 %             
       0-10                

 

 Automated blood basophils/100 leukocytes                    0 %               
     0-10                

 

 Blood neutrophils automated count (number/volume)                    3.8 10*3 
                   1.8-7.8                

 

 Blood lymphocytes automated count (number/volume)                    2.8 10*3 
                   1.0-4.0                

 

 Blood monocytes automated count (number/volume)                    0.6 10*3   
                 0.0-1.0                

 

 Automated eosinophil count                    0.1 10*3/uL                    
0.0-0.3                

 

 Automated blood basophil count (count/volume)                    0.0 10*3/uL  
                  0.0-0.1                









 Serum or plasma choriogonadotropin (pregnancy test) detection - 17 06:50
                









 Serum or plasma choriogonadotropin (pregnancy test) detection                 
   NEGATIVE                     NEGATIVE                









 Blood CBC with ordered manual differential panel - 17 16:55             
   









 Blood leukocytes automated count (number/volume)                    8.8 10*3/
uL                    4.3-11.0                

 

 Blood erythrocytes automated count (number/volume)                    4.49 10*6
/uL                    4.35-5.85                

 

 Venous blood hemoglobin measurement (mass/volume)                    14.3 g/dL
                    11.5-16.0                

 

 Blood hematocrit (volume fraction)                    41 %                    
35-52                

 

 Automated erythrocyte mean corpuscular volume                    91 [foz_us]  
                  80-99                

 

 Automated erythrocyte mean corpuscular hemoglobin (mass per erythrocyte)      
              32 pg                    25-34                

 

 Automated erythrocyte mean corpuscular hemoglobin concentration measurement (
mass/volume)                    35 g/dL                    32-36                

 

 Automated erythrocyte distribution width ratio                    12.5 %      
              10.0-14.5                

 

 Automated blood platelet count (count/volume)                    328 10*3/uL  
                  130-400                

 

 Automated blood platelet mean volume measurement                    9.6 [foz_us
]                    7.4-10.4                

 

 Automated blood neutrophils/100 leukocytes                    59 %            
        42-75                

 

 Automated blood lymphocytes/100 leukocytes                    34 %            
        12-44                

 

 Blood monocytes/100 leukocytes                    7 %                    NRG  
              

 

 Automated blood eosinophils/100 leukocytes                    1 %             
       0-10                

 

 Automated blood basophils/100 leukocytes                    0 %               
     0-10                

 

 Blood neutrophils automated count (number/volume)                    5.2 10*3 
                   1.8-7.8                

 

 Blood lymphocytes automated count (number/volume)                    3.0 10*3 
                   1.0-4.0                

 

 Blood monocytes automated count (number/volume)                    0.6 10*3   
                 0.0-1.0                

 

 Automated eosinophil count                    0.1 10*3/uL                    
0.0-0.3                

 

 Automated blood basophil count (count/volume)                    0.0 10*3/uL  
                  0.0-0.1                

 

 Manual blood segmented neutrophils/100 leukocytes                    51 %     
               NRG                

 

 Manual blood lymphocytes/100 leukocytes                    42 %               
     NRG                

 

 Blood erythrocyte morphology finding identification                    NORMAL 
                    NRG                









 Complete urinalysis with reflex to culture - 17 16:55                









 Urine color determination                    YELLOW                     NRG   
             

 

 Urine clarity determination                    SLIGHTLY CLOUDY                
     NRG                

 

 Urine pH measurement by test strip                    6                     5-
9                

 

 Specific gravity of urine by test strip                    1.020              
       1.016-1.022                

 

 Urine protein assay by test strip, semi-quantitative                    1+    
                 NEGATIVE                

 

 Urine glucose detection by automated test strip                    NEGATIVE   
                  NEGATIVE                

 

 Erythrocytes detection in urine sediment by light microscopy                  
  5+                     NEGATIVE                

 

 Urine ketones detection by automated test strip                    NEGATIVE   
                  NEGATIVE                

 

 Urine nitrite detection by test strip                    NEGATIVE             
        NEGATIVE                

 

 Urine total bilirubin detection by test strip                    NEGATIVE     
                NEGATIVE                

 

 Urine urobilinogen measurement by automated test strip (mass/volume)          
          NORMAL                     NORMAL                

 

 Urine leukocyte esterase detection by dipstick                    2+          
           NEGATIVE                

 

 Automated urine sediment erythrocyte count by microscopy (number/high power 
field)                    TNTC                     NRG                

 

 Automated urine sediment leukocyte count by microscopy (number/high power field
)                    RARE                     NRG                

 

 Bacteria detection in urine sediment by light microscopy                    
NEGATIVE                     NRG                

 

 Squamous epithelial cells detection in urine sediment by light microscopy     
               5-10                     NRG                

 

 Crystals detection in urine sediment by light microscopy                    
NONE                     NRG                

 

 Casts detection in urine sediment by light microscopy                    NONE 
                    NRG                

 

 Mucus detection in urine sediment by light microscopy                    
NEGATIVE                     NRG                

 

 Complete urinalysis with reflex to culture                    NO              
       NRG                









 Comprehensive metabolic panel - 17 16:55                









 Serum or plasma sodium measurement (moles/volume)                    138 mmol/
L                    135-145                

 

 Serum or plasma potassium measurement (moles/volume)                    3.9 
mmol/L                    3.6-5.0                

 

 Serum or plasma chloride measurement (moles/volume)                    107 mmol
/L                                    

 

 Carbon dioxide                    20 mmol/L                    21-32          
      

 

 Serum or plasma anion gap determination (moles/volume)                    11 
mmol/L                    5-14                

 

 Serum or plasma urea nitrogen measurement (mass/volume)                    11 
mg/dL                    7-18                

 

 Serum or plasma creatinine measurement (mass/volume)                    0.67 mg
/dL                    0.60-1.30                

 

 Serum or plasma urea nitrogen/creatinine mass ratio                    16     
                NRG                

 

 Serum or plasma creatinine measurement with calculation of estimated 
glomerular filtration rate                    >                     NRG        
        

 

 Serum or plasma glucose measurement (mass/volume)                    83 mg/dL 
                                   

 

 Serum or plasma calcium measurement (mass/volume)                    9.4 mg/dL
                    8.5-10.1                

 

 Serum or plasma total bilirubin measurement (mass/volume)                    
0.2 mg/dL                    0.1-1.0                

 

 Serum or plasma alkaline phosphatase measurement (enzymatic activity/volume)  
                  59 U/L                                    

 

 Serum or plasma aspartate aminotransferase measurement (enzymatic activity/
volume)                    15 U/L                    5-34                

 

 Serum or plasma alanine aminotransferase measurement (enzymatic activity/volume
)                    18 U/L                    0-55                

 

 Serum or plasma protein measurement (mass/volume)                    7.4 g/dL 
                   6.4-8.2                

 

 Serum or plasma albumin measurement (mass/volume)                    4.1 g/dL 
                   3.2-4.5                



                                                                               
                                       



Encounters

                      





 ACCT No.                    Visit Date/Time                    Discharge      
              Status                    Pt. Type                    Provider   
                 Facility                    Loc./Unit                    
Complaint                

 

 [id number]                    2013 12:00:00                    2013 23:
59:59                    [facility]                    Outpatient                    
LETICIA SORENSON                                                      
                         

 

 [id number]                    2011 11:25:00                    2011 23:
59:59                    [facility]                    Outpatient
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
## 2017-07-17 ENCOUNTER — HOSPITAL ENCOUNTER (OUTPATIENT)
Dept: HOSPITAL 75 - RAD | Age: 27
End: 2017-07-17
Attending: FAMILY MEDICINE
Payer: MEDICAID

## 2017-07-17 DIAGNOSIS — Z36: Primary | ICD-10-CM

## 2017-07-17 DIAGNOSIS — Z3A.01: ICD-10-CM

## 2017-07-17 PROCEDURE — 76801 OB US < 14 WKS SINGLE FETUS: CPT

## 2017-07-17 NOTE — DIAGNOSTIC IMAGING REPORT
First trimester OB ultrasound.



INDICATION: Dating.



FINDINGS: There is a normal-appearing single intrauterine

pregnancy. An embryo is seen with cardiac activity at 160 beats

per minute.



The crown-rump length is at 7 weeks and 5 days. ELIAZAR is 2/28/18.

The right ovary demonstrates a corpus luteum cyst measuring 2.7 x

2.4 x 3.1 CM. The left ovary is not seen.



IMPRESSION: Live single intrauterine pregnancy.



Dictated by: 



  Dictated on workstation # [id number]

## 2017-08-07 ENCOUNTER — HOSPITAL ENCOUNTER (OUTPATIENT)
Dept: HOSPITAL 75 - RAD | Age: 27
End: 2017-08-07
Attending: FAMILY MEDICINE
Payer: MEDICAID

## 2017-08-07 DIAGNOSIS — O20.8: Primary | ICD-10-CM

## 2017-08-07 DIAGNOSIS — Z3A.11: ICD-10-CM

## 2017-08-07 PROCEDURE — 76801 OB US < 14 WKS SINGLE FETUS: CPT

## 2017-08-07 NOTE — DIAGNOSTIC IMAGING REPORT
PROCEDURE: 

US OB SINGLE FETUS <14 WKS.



TECHNIQUE: 

Multiple Real-time grayscale images were obtained over the gravid

uterus in various projections. 



INDICATION: 

Vaginal bleeding for the last 6 hours, pregnancy.



COMPARISON: 

07/17/2017.



DISCUSSION: 

Transabdominal sonographic evaluation of the pelvis was

performed. A single live intrauterine pregnancy is again

demonstrated at 11 weeks 0 days by today's sonographic

measurements. The EDC by today's ultrasound is 02/26/2017. The

crown/rump length measures 40.0 mm. There has been interval

development of a small subchronic hemorrhage measuring 1.6 x 1.5

x 1.4 cm. The fetal heart rate measures 179 BPM. No abnormal

adnexal mass or fluid.



IMPRESSION:

1. Single live intrauterine pregnancy at 11 weeks 0 days by

today's sonographic measurements.



2. Small adjacent subchronic hemorrhage, new.



Dictated by: 



  Dictated on workstation # [id number]

## 2017-08-16 ENCOUNTER — HOSPITAL ENCOUNTER (OUTPATIENT)
Dept: HOSPITAL 75 - RAD | Age: 27
End: 2017-08-16
Attending: FAMILY MEDICINE
Payer: MEDICAID

## 2017-08-16 DIAGNOSIS — Z3A.12: ICD-10-CM

## 2017-08-16 PROCEDURE — 76801 OB US < 14 WKS SINGLE FETUS: CPT

## 2017-08-16 NOTE — DIAGNOSTIC IMAGING REPORT
PROCEDURE: US OB SINGLE FETUS <14 WKS.



TECHNIQUE: Multiple real-time grayscale images were obtained over

the gravid uterus in various projections. 



INDICATION:   No fetal heart tones.



The recent OB ultrasound exam of 8/7/2017, noted a single live

intrauterine pregnancy approximately 11 weeks gestation +/- 1

week. There was a small subchronic hemorrhage adjacent to the

gestational sac.



On this exam the fetus is again identified. Fetal heart motion

was noted, and a rate of 155 BPM was recorded. The fetal growth

parameters are fairly uniform and suggest that the estimated

gestational age is 13 weeks 2 days +/- 1 week. This would be

consistent with the measurements of the previous exam. There were

no obvious fetal abnormalities identified. If a more sensitive

evaluation of the fetal anatomy is desired, however, then a

short-term (6 to 8-week) followup ultrasound exam should be

obtained.



The amniotic fluid volume is within normal limits. The placenta

appears to be developing anteriorly.



The subchronic hemorrhage noted on the prior exam is not well

visualized on this study.



IMPRESSION:

1. There is a single live fetus approximately 12 weeks gestation

+/- 1 week. EDC remains 2/28/2018.

2. There are no obvious fetal abnormalities identified.

Recommendations as above. 



Dictated by: 



  Dictated on workstation # [id number]

## 2017-09-12 ENCOUNTER — HOSPITAL ENCOUNTER (EMERGENCY)
Dept: HOSPITAL 75 - ER | Age: 27
Discharge: HOME | End: 2017-09-12
Payer: MEDICAID

## 2017-09-12 VITALS — HEIGHT: 65 IN | BODY MASS INDEX: 41.11 KG/M2 | WEIGHT: 246.75 LBS

## 2017-09-12 VITALS — SYSTOLIC BLOOD PRESSURE: 114 MMHG | DIASTOLIC BLOOD PRESSURE: 71 MMHG

## 2017-09-12 DIAGNOSIS — Z87.448: ICD-10-CM

## 2017-09-12 DIAGNOSIS — O44.02: ICD-10-CM

## 2017-09-12 DIAGNOSIS — Z82.49: ICD-10-CM

## 2017-09-12 DIAGNOSIS — Z3A.16: ICD-10-CM

## 2017-09-12 DIAGNOSIS — Z87.19: ICD-10-CM

## 2017-09-12 DIAGNOSIS — O23.42: ICD-10-CM

## 2017-09-12 DIAGNOSIS — Z87.891: ICD-10-CM

## 2017-09-12 DIAGNOSIS — O20.9: Primary | ICD-10-CM

## 2017-09-12 DIAGNOSIS — Z87.59: ICD-10-CM

## 2017-09-12 LAB
BILIRUB UR QL STRIP: NEGATIVE
KETONES UR QL STRIP: NEGATIVE
LEUKOCYTE ESTERASE UR QL STRIP: (no result)
NITRITE UR QL STRIP: NEGATIVE
PH UR STRIP: 7 [PH] (ref 5–9)
PROT UR QL STRIP: NEGATIVE
SP GR UR STRIP: 1.01 (ref 1.02–1.02)
UROBILINOGEN UR-MCNC: NORMAL MG/DL
WBC #/AREA URNS HPF: (no result) /HPF

## 2017-09-12 PROCEDURE — 76805 OB US >/= 14 WKS SNGL FETUS: CPT

## 2017-09-12 PROCEDURE — 87088 URINE BACTERIA CULTURE: CPT

## 2017-09-12 PROCEDURE — 81000 URINALYSIS NONAUTO W/SCOPE: CPT

## 2017-09-12 PROCEDURE — 99283 EMERGENCY DEPT VISIT LOW MDM: CPT

## 2017-09-12 NOTE — ED GU-FEMALE
General


Chief Complaint:  -Female


Stated Complaint:  PT FELL,SPOTTING,16 WKS PREG


Source:  patient


Exam Limitations:  no limitations


 (KENNEDY HENRIQUEZ)





History of Present Illness


Time seen by provider:  17:30


Initial Comments


, 7 miscarriages, 2 living. At 16 weeks and 2 days by last menstrual 

period, good dating confirmed by ultrasound at 12 weeks based on fertility 

drugs. She knows the period is good Patient presents to ER by private 

conveyance with a chief complaint that she has had some spotting today. She had 

a fall. Yesterday she was checked by her provider in the clinic and started 

having some spotting after being checked. But today she fell on her belly and 

had heavier spotting after that. She says her last ultrasound was proximal to 4 

weeks ago when she had been told the time she had some subchorionic hemorrhage. 

She did not mention anything about placenta previa. She admits to having pain 

in her pelvis and cervix area. She is otherwise having an unremarkable 

pregnancy. She denies any nausea, vomiting, dysuria, discharge, leaking, 

headache, blurry vision, double vision, right upper quadrant pain.


 (KENNEDY HENRIQUEZ)





Allergies and Home Medications


Allergies


Coded Allergies:  


     Penicillins (Verified  Allergy, Mild, 17)





Home Medications


Meclizine HCl 12.5 Mg Tablet, Unknown Dose PO, (Reported)


Pnv No.122/Iron/Folic Acid 1 Each Tablet, 1 EACH PO, (Reported)





Constitutional:  No chills, No diaphoresis


EENTM:  No ear pain, No eye pain


Respiratory:  No cough, No short of breath


Cardiovascular:  No chest pain, No Hx of Intervention, No palpitations, No 

vascular heart diseas


Gastrointestinal:  No abdominal pain, No constipation, No diarrhea


Genitourinary:  denies burning, denies discharge, denies dysuria


Musculoskeletal:  No back pain, No joint pain


Skin:  No pruritus, No rash


Psychiatric/Neurological:  Denies Headache, Denies Numbness, Denies Paresthesia 

(KENNEDY HENRIQUEZ)





Past Medical-Social-Family Hx


Patient Social History


Alcohol Use:  Denies Use


Recreational Drug Use:  No


Smoking Status:  Former Smoker


Type Used:  Cigarettes


Former Smoker, Quit:  2006


2nd Hand Smoke Exposure:  No


Recent Foreign Travel:  No


Contact w/Someone Who Travel:  No


Recent Hopitalizations:  No


 (KENNEDY HENRIQUEZ)





Immunizations Up To Date


Tetanus Booster (TDap):  More than 5yrs


Date of Influenza Vaccine:  Aug 4, 2016


 (KENNEDY HENRIQUEZ)





Seasonal Allergies


Seasonal Allergies:  No


 (KENNEDY HENRIQUEZ)





Surgeries


History of Surgeries:  Yes (endometriosis, ovarian cyst removal, d&c, )


Surgeries:  Gallbladder, Tonsillectomy


 (KENNEDY HENRIQUEZ)





Respiratory


History of Respiratory Disorde:  No


 (KENNEDY HENRIQUEZ)





Cardiovascular


History of Cardiac Disorders:  No


 (KENNEDY HENRIQUEZ)





Neurological


History of Neurological Disord:  No


 (KENNEDY HENRIQUEZ)





Reproductive System


Hx Reproductive Disorders:  Yes (DUB, OVARIAN CYSTS, SEPTUM REMOVED FROM UTERUS

; MULTIPLE MISCARRIAGES)


Sexually Transmitted Disease:  No


HIV/AIDS:  No


Female Reproductive Disorders:  Endometriosis, Ovarian Cyst


 (KENNEDY HENRIQUEZ)





Genitourinary


History of Genitourinary Disor:  No


 (KENNEDY HENRIQUEZ)





Gastrointestinal


History of Gastrointestinal Di:  Yes (gallbladder removed )


Gastrointestinal Disorders:  Gall Bladder Disease


 (KENNEDY HENRIQUEZ)





Musculoskeletal


History of Musculoskeletal Dis:  No


 (KENNEDY HENRIQUEZ)





Endocrine


History of Endocrine Disorders:  No


 (KENNEDY HENRIQUEZ)





HEENT


History of HEENT Disorders:  No


HEENT Disorders:  Tonsilitis


Loss of Vision:  Bilateral


Hearing Impairment:  Denies


 (KENNEDY HENRIQUEZ)





Cancer


History of Cancer:  No


 (KENNEDY HENRIQUEZ)





Psychosocial


History of Psychiatric Problem:  No


 (KENNEDY HENRIQUEZ)





Integumentary


History of Skin or Integumenta:  No


 (KENNEDY HENRIQUEZ)





Blood Transfusions


History of Blood Disorders:  No


 (KENNEDY HENRIQUEZ)





Family Medical History


Family Medial History:  


Asthma


  19 MOTHER


  maternal gdma


Cardiovascular disease


  maternal gdma


Completed stroke


  MATERNAL GDPA


Deafness or hearing loss


  19 MOTHER


Diabetes mellitus


  19 FATHER


Myocardial infarction


  maternal gdma


  MATERNAL GDPA


Psychosocial problem


  19 MOTHER


Respiratory disorder


  19 MOTHER


  maternal gdma


Seizure disorder


  19 MOTHER (EPILEPSY)


  G8 BROTHER (EPILEPSY)





No Family History of:


  AIDS


  Abdominal aortic aneurysm


  Spearfish's disease


  Alcoholism


  Alzheimer's disease


  Aphasia


  Arthritis


  Cancer of mouth


  Cataracts


  Colon cancer


  Congenital disease


  Congenital heart disease


  Coronary thrombosis


  Cystic fibrosis


  Dementia


  Drug abuse


  Dysphasia


  Fibrocystic disease of breast


  Gastroenteritis


  Glaucoma


  Headache disorder


  Hypercholesterolemia


  Hypertension


  Infertility


  Kidney disease


  Neoplasm


  Not obtainable due to adoption


  Osteoporosis


  Parkinson's disease


  Prostate cancer


  Severe allergy


  Thyroid disease


  Tuberculosis


  Visual disorder (KENNEDY HENRIQUEZ)


Family Medial History:  


Asthma


  19 MOTHER


  maternal gdma


Cardiovascular disease


  maternal gdma


Completed stroke


  MATERNAL GDPA


Deafness or hearing loss


  19 MOTHER


Diabetes mellitus


  19 FATHER


Myocardial infarction


  maternal gdma


  MATERNAL GDPA


Psychosocial problem


  19 MOTHER


Respiratory disorder


  19 MOTHER


  maternal gdma


Seizure disorder


  19 MOTHER (EPILEPSY)


  G8 BROTHER (EPILEPSY)





No Family History of:


  AIDS


  Abdominal aortic aneurysm


  Spearfish's disease


  Alcoholism


  Alzheimer's disease


  Aphasia


  Arthritis


  Cancer of mouth


  Cataracts


  Colon cancer


  Congenital disease


  Congenital heart disease


  Coronary thrombosis


  Cystic fibrosis


  Dementia


  Drug abuse


  Dysphasia


  Fibrocystic disease of breast


  Gastroenteritis


  Glaucoma


  Headache disorder


  Hypercholesterolemia


  Hypertension


  Infertility


  Kidney disease


  Neoplasm


  Not obtainable due to adoption


  Osteoporosis


  Parkinson's disease


  Prostate cancer


  Severe allergy


  Thyroid disease


  Tuberculosis


  Visual disorder (LIV MONROY MD)





Physical Exam


Vital Signs





Vital Sign - Last 12Hours








 17





 17:45


 


Temp 98.2


 


Pulse 84


 


Resp 12


 


B/P (MAP) 118/72


 


Pulse Ox 96








 (LIV MONROY MD)


Vital Signs


Capillary Refill :  


 (KENNEDY HENRIQUEZ)


General Appearance:  WD/WN, no apparent distress


HEENT:  PERRL/EOMI, pharynx normal


Neck:  non-tender, normal inspection


Cardiovascular:  normal peripheral pulses, regular rate, rhythm, no edema


Respiratory:  chest non-tender, lungs clear, normal breath sounds


Gastrointestinal:  normal bowel sounds, non tender, soft, other (gravid below 

the umbilicus)


Genital/Rectal:  normal genital exam, normal vaginal exam, other (sterile 

speculum exam the cervix os is easily visualized without any overt or soraya 

blood. It appears closed on examination and to fingertip, medium consistency 

and retroflexed.)


Pelvic:  normal external exam


Back:  normal inspection, no CVA tenderness, no vertebral tenderness


Extremities:  non-tender, normal capillary refill


Neurologic/Psychiatric:  alert, oriented x 3


Skin:  normal color, warm/dry (KENNEDY HENRIQUEZ)





Progress/Results/Core Measures


Results/Orders


Lab Results





Laboratory Tests








Test


  17


17:45 Range/Units


 


 


Urine Color YELLOW   


 


Urine Clarity


  SLIGHTLY


CLOUDY  


 


 


Urine pH 7  5-9  


 


Urine Specific Gravity 1.015 L 1.016-1.022  


 


Urine Protein NEGATIVE  NEGATIVE  


 


Urine Glucose (UA) NEGATIVE  NEGATIVE  


 


Urine Ketones NEGATIVE  NEGATIVE  


 


Urine Nitrite NEGATIVE  NEGATIVE  


 


Urine Bilirubin NEGATIVE  NEGATIVE  


 


Urine Urobilinogen NORMAL  NORMAL  MG/DL


 


Urine Leukocyte Esterase 2+ H NEGATIVE  


 


Urine RBC (Auto) NEGATIVE  NEGATIVE  


 


Urine RBC NONE   /HPF


 


Urine WBC 5-10 H  /HPF


 


Urine Squamous Epithelial


Cells 10-25 H


   /HPF


 


 


Urine Crystals PRESENT H  /LPF


 


Urine Amorphous Sediment


  LARGE CYNDEE


PHOSPHATE H  /LPF


 


 


Urine Bacteria FEW H  /HPF


 


Urine Casts NONE   /LPF


 


Urine Mucus SMALL H  /LPF


 


Urine Culture Indicated YES   





 (LIV MONROY MD)


Vital Signs/I&O





Vital Sign - Last 12Hours








 17





 17:45


 


Temp 98.2


 


Pulse 84


 


Resp 12


 


B/P (MAP) 118/72


 


Pulse Ox 96





 (LIV MONROY MD)


Progress Note :  


   Time:  18:12


Progress Note


Very difficult to Doppler fetal heart tones however this could be due to the 

patient's habitus so we will go ahead and get the ultrasound given her history 

of vaginal bleeding today.


 (KENNEDY HENRIQUEZ)


Progress Note :  


   Time:  20:18


Progress Note


Care of this patient was assumed from Dr. Henriquez at shift change.  Ultrasound 

was pending and has now been reviewed.  There is a placental previa versus low-

lying placenta.  Patient is being placed on pelvic rest.  There is suggestion 

of urinary tract infection on the UA.  Patient has a penicillin allergy and 

Macrobid will therefore be prescribed.  Dr. Culver was provided with a courtesy 

call to update him on her condition.


 (LIV MONROY MD)





Departure


Impression


Impression:  


 Primary Impression:  


 Vaginal bleeding


 Additional Impressions:  


 Fall


 Qualified Codes:  W19.XXXA - Unspecified fall, initial encounter


 Pelvic pain


 Placenta previa


 Qualified Codes:  O44.02 - Complete placenta previa nos or without hemorrhage, 

second trimester


 Urinary tract infection


 Qualified Codes:  N39.0 - Urinary tract infection, site not specified


Disposition:   HOME, SELF-CARE


Condition:  Stable





Departure-Patient Inst.


Decision time for Depature:  20:15


 (LIV MONROY MD)


Referrals:  


JOEY CULVER MD (PCP/Family)


Primary Care Physician


Patient Instructions:  Placenta Previa





Add. Discharge Instructions:  


Your placenta is near the cervical opening which means you may have a placental 

previa.  You should observe pelvic rest meaning nothing in the vagina including 

intercourse until otherwise instructed.  No strenuous or aggressive activity 

until cleared by Dr. Culver.  Call Dr. Culver's clinic in the morning to schedule 

follow-up.  Return to the ER if symptoms worsen.  Complete your antibiotics as 

prescribed.














All discharge instructions reviewed with patient and/or family. Voiced 

understanding.


Scripts


Nitrofurantoin Monohyd/M-Cryst (Macrobid 100 mg Capsule) 100 Mg Capsule


1 TAB PO BID, #14 CAP


   Prov: LIV MONROY MD         17





Copy


Copies To 1:   JOEY CULVER MD, TITUS J Sep 12, 2017 17:40


LIV MONROY MD Sep 12, 2017 20:22

## 2017-09-12 NOTE — DIAGNOSTIC IMAGING REPORT
INDICATION: 27-year-old pregnant female presents with cramping

after falling down 4 stairs. Patient also has low pelvic pain and

spotting.



COMPARISON: 8/16/17.



TECHNIQUE: Multiple real-time grayscale images were obtained over

the gravid uterus.



FINDINGS:



There is a single live intrauterine pregnancy in variable

presentation. The placenta is grade 1 and anterior. There is

placenta previa versus a low-lying placenta. There is positive

cardiac rate at 149 beats per minute. Growth parameters are given

below.



Biometrical measurements are as follows:

Biparietal 3.6 cm, age 17 weeks 1 days.

Head circumference 13.5 cm, age 17 weeks 1 days.

Abdominal circumference 10.9 cm, age 16 weeks 6 days.

Femur length 2.2 cm, age 16 weeks 4 days.



Sonographic estimate age: 17 weeks 0 days.

Sonographic estimated date of delivery: 02/20/18.



Estimated Fetal Weight: 166 gm (+/- 24  gm).

LMP percentile: 91%.



Fetal heart rate: 149 beats per minute.



Fetal number: 1 of 1.





IMPRESSION: 

1. Single live IUP in variable presentation. There is positive

cardiac activity at a rate of 149 beats per minute. The average

ultrasound gestational age is 17 weeks 0 days as compared to 15

weeks and 6 days when compared to the prior ultrasound.

2. Placenta previa versus a low-lying placenta.



Dictated by: 



  Dictated on workstation # [id number]

## 2017-10-02 ENCOUNTER — HOSPITAL ENCOUNTER (OUTPATIENT)
Dept: HOSPITAL 75 - RAD | Age: 27
End: 2017-10-02
Attending: FAMILY MEDICINE
Payer: MEDICAID

## 2017-10-02 DIAGNOSIS — Z3A.19: ICD-10-CM

## 2017-10-02 DIAGNOSIS — Z36.3: Primary | ICD-10-CM

## 2017-10-02 PROCEDURE — 76805 OB US >/= 14 WKS SNGL FETUS: CPT

## 2017-10-02 NOTE — DIAGNOSTIC IMAGING REPORT
INDICATION: Fetal survey. 



TECHNIQUE: Multiple real-time grayscale images were obtained over

the gravid uterus.



COMPARISON: 9/12/2017



FINDINGS: Fetal heart rate is 161 beats per minute. The placenta

is anterior. No placenta previa. The cervix is 4.3 cm in length

and is closed.



The four-chamber view is obscured related to fetal position. The

stomach, the cord insertion, the bladder, three-vessel cord,

posterior fossa, and no ventriculomegaly is demonstrated. The

fetal spine is not well demonstrated. No hydronephrosis or cystic

lesion seen at the level of the kidneys.



Biometrical measurements are as follows:

Biparietal 4.7 cm, age 20 weeks 2 days, at 96th percentile.

Head circumference 17.37 cm, age 20 weeks 0 days, at 91st

percentile.

Abdominal circumference 14.62 cm, age 20 weeks 0 days, percentile

is not available.

Femur length 2.95 cm, age 19 weeks 1 days, at 58th percentile.



Sonographic estimate age: 19 weeks 6 days.

Sonographic estimated date of delivery: 2/20/2018.



Estimated Fetal Weight: 302 gm (+/- 44  gm).

LMP percentile: 91%.



Fetal heart rate: 161 beats per minute.



Fetal number: 1 of 1.



IMPRESSION: The four-chamber view and the fetal spine are not

well demonstrated due to fetal position. Followup study within

2-3 weeks is recommended to reevaluate.



Dictated by: 



  Dictated on workstation # [id number]

## 2017-12-14 ENCOUNTER — HOSPITAL ENCOUNTER (OUTPATIENT)
Dept: HOSPITAL 75 - RAD | Age: 27
End: 2017-12-14
Attending: FAMILY MEDICINE
Payer: MEDICAID

## 2017-12-14 DIAGNOSIS — Z34.90: Primary | ICD-10-CM

## 2017-12-14 DIAGNOSIS — Z3A.00: ICD-10-CM

## 2017-12-14 PROCEDURE — 76816 OB US FOLLOW-UP PER FETUS: CPT

## 2017-12-14 NOTE — DIAGNOSTIC IMAGING REPORT
INDICATION: Followup heart and spine.



TECHNIQUE: Multiple real-time grayscale images were obtained over

the gravid uterus.



COMPARISON: 10/2/17



FINDINGS: Fetal heart rate is 132 beats per minute. Adequate

amniotic fluid is seen with ADAL of 17 cm. The placenta is

anterior. No placenta previa. The maternal adnexa are obscured by

the gravid uterus.



The cervix is 4.2 cm in length and is closed.



The spine is not well seen due to fetal position. The

four-chamber view appears unremarkable.



IMPRESSION: The fetal spine is still not seen.



Dictated by: 



  Dictated on workstation # [id number]

## 2018-01-03 ENCOUNTER — HOSPITAL ENCOUNTER (OUTPATIENT)
Dept: HOSPITAL 75 - WSO | Age: 28
Discharge: HOME | End: 2018-01-03
Attending: FAMILY MEDICINE
Payer: MEDICAID

## 2018-01-03 VITALS — BODY MASS INDEX: 43.99 KG/M2 | HEIGHT: 65 IN | WEIGHT: 264 LBS

## 2018-01-03 VITALS — DIASTOLIC BLOOD PRESSURE: 82 MMHG | SYSTOLIC BLOOD PRESSURE: 118 MMHG

## 2018-01-03 DIAGNOSIS — O23.43: Primary | ICD-10-CM

## 2018-01-03 DIAGNOSIS — Z3A.32: ICD-10-CM

## 2018-01-03 LAB
APTT PPP: YELLOW S
BACTERIA #/AREA URNS HPF: (no result) /HPF
BILIRUB UR QL STRIP: NEGATIVE
FIBRINOGEN PPP-MCNC: CLEAR MG/DL
GLUCOSE UR STRIP-MCNC: NEGATIVE MG/DL
KETONES UR QL STRIP: NEGATIVE
LEUKOCYTE ESTERASE UR QL STRIP: (no result)
NITRITE UR QL STRIP: NEGATIVE
PH UR STRIP: 6 [PH] (ref 5–9)
PROT UR QL STRIP: NEGATIVE
RBC #/AREA URNS HPF: (no result) /HPF
SP GR UR STRIP: 1.02 (ref 1.02–1.02)
SQUAMOUS #/AREA URNS HPF: >50 /HPF
UROBILINOGEN UR-MCNC: NORMAL MG/DL

## 2018-01-03 PROCEDURE — 96361 HYDRATE IV INFUSION ADD-ON: CPT

## 2018-01-03 PROCEDURE — 96374 THER/PROPH/DIAG INJ IV PUSH: CPT

## 2018-01-03 PROCEDURE — 76816 OB US FOLLOW-UP PER FETUS: CPT

## 2018-01-03 PROCEDURE — 99213 OFFICE O/P EST LOW 20 MIN: CPT

## 2018-01-03 PROCEDURE — 81000 URINALYSIS NONAUTO W/SCOPE: CPT

## 2018-01-03 PROCEDURE — 87088 URINE BACTERIA CULTURE: CPT

## 2018-01-03 NOTE — DIAGNOSTIC IMAGING REPORT
INDICATION: Unable to find fetal heart tones.



COMPARISON: 12/14/2017.



TECHNIQUE: Multiple real-time grayscale images were obtained over

the gravid uterus.



FINDINGS: A single live intrauterine gestation is identified in a

cephalic presentation. The placenta is anteriorly located without

evidence of placenta previa. The cervical length is 4.3 cm. Fetal

biometrics are symmetric, consistent with an estimated

gestational age of 35 weeks and 3 days. Therefore, there is an

estimated due date based upon this examination of 2/4/2018.

Findings are borderline advanced when compared to clinical dating

and the prior ultrasound. Amniotic fluid index is within normal

limits at 11.1 cm with the largest single pocket measuring 4.5

cm. Fetal cardiac motion is documented at 125 beats per minute.



Evaluation of fetal anatomy is significantly limited secondary to

advanced gestational age. No hydrocephalus.



IMPRESSION:

1. Single live intrauterine gestation at an estimated gestational

age of 35 weeks and 3 days. This is borderline advanced compared

to clinical dating and has advanced slightly greater than

expected when compared to the prior examinations.

2. Fetal cardiac motion is documented at 125 beats per minute.

3. Additional findings as above. Fetal anatomy is not well

evaluated secondary to advanced gestational age.



Dictated by: 



  Dictated on workstation # [id number]

## 2018-01-04 NOTE — PHYSICIAN QUERY-FINAL DX
HAIDER CHILDRESS 1/4/18 1203:


Clinic Account Progress/Dx


Physician Query:


Please give diagnosis


Date of Service





Charan 3, 2018 at 17:31





JOEY CULVER MD 1/10/18 0730:


Clinic Account Progress/Dx


DIAGNOSIS:


Diagnosis


1.  IUP at 32 weeks gestation


2.  Uterine irritability secondary to number 3


3.  Urinary tract infection











HAIDER CHILDRESS Jan 4, 2018 12:03


JOEY CULVER MD Charan 10, 2018 07:30

## 2018-01-09 ENCOUNTER — HOSPITAL ENCOUNTER (OUTPATIENT)
Dept: HOSPITAL 75 - RAD | Age: 28
End: 2018-01-09
Attending: FAMILY MEDICINE
Payer: MEDICAID

## 2018-01-09 DIAGNOSIS — N13.30: Primary | ICD-10-CM

## 2018-01-09 PROCEDURE — 76770 US EXAM ABDO BACK WALL COMP: CPT

## 2018-01-09 NOTE — DIAGNOSTIC IMAGING REPORT
INDICATION: 

Right flank pain.



TECHNIQUE: 

Multiple Real-time grayscale images were obtained over the

kidneys in various projections.



FINDINGS: 

The right kidney measures 14 x 6 x 6.6 cm and the left is 13.8 x

7.3 x 6.9 cm. Both kidneys demonstrate relatively normal renal

cortical thickness and echogenicity. There is mild right

hydronephrosis. There is no mass or calculus in either kidney.

The bladder was empty. The IVC is not visualized. 



IMPRESSION: 

Mild right hydronephrosis; otherwise, unremarkable renal

ultrasound.



Dictated by: 



  Dictated on workstation # [id number]

## 2018-01-31 ENCOUNTER — HOSPITAL ENCOUNTER (OUTPATIENT)
Dept: HOSPITAL 75 - WSO | Age: 28
Discharge: HOME | End: 2018-01-31
Attending: FAMILY MEDICINE
Payer: MEDICAID

## 2018-01-31 VITALS — SYSTOLIC BLOOD PRESSURE: 116 MMHG | DIASTOLIC BLOOD PRESSURE: 57 MMHG

## 2018-01-31 VITALS — HEIGHT: 64 IN | WEIGHT: 269.05 LBS | BODY MASS INDEX: 45.93 KG/M2

## 2018-01-31 DIAGNOSIS — O26.853: Primary | ICD-10-CM

## 2018-01-31 DIAGNOSIS — Z3A.36: ICD-10-CM

## 2018-01-31 LAB
AMORPH SED URNS QL MICRO: (no result) /LPF
APTT PPP: YELLOW S
BACTERIA #/AREA URNS HPF: (no result) /HPF
BILIRUB UR QL STRIP: NEGATIVE
FIBRINOGEN PPP-MCNC: (no result) MG/DL
GLUCOSE UR STRIP-MCNC: NEGATIVE MG/DL
KETONES UR QL STRIP: NEGATIVE
LEUKOCYTE ESTERASE UR QL STRIP: (no result)
NITRITE UR QL STRIP: NEGATIVE
PH UR STRIP: 6.5 [PH] (ref 5–9)
PROT UR QL STRIP: NEGATIVE
RBC #/AREA URNS HPF: (no result) /HPF
SP GR UR STRIP: 1.02 (ref 1.02–1.02)
UROBILINOGEN UR-MCNC: NORMAL MG/DL

## 2018-01-31 PROCEDURE — 99212 OFFICE O/P EST SF 10 MIN: CPT

## 2018-01-31 PROCEDURE — 81000 URINALYSIS NONAUTO W/SCOPE: CPT

## 2018-01-31 PROCEDURE — 87088 URINE BACTERIA CULTURE: CPT

## 2018-02-01 NOTE — PHYSICIAN QUERY-FINAL DX
HAIDER CHILDRESS 2/1/18 1333:


Clinic Account Progress/Dx


Physician Query:


Please give diagnosis


Date of Service





Jan 31, 2018 at 15:22





JOEY CULVER MD 2/6/18 0737:


Clinic Account Progress/Dx


DIAGNOSIS:


Diagnosis


1.  IUP in 3rd trimester


2. Vaginal spotting of blood--resolved











HAIDER CHILDRESS Feb 1, 2018 13:33


JOEY CULVER MD Feb 6, 2018 07:37

## 2018-02-21 ENCOUNTER — HOSPITAL ENCOUNTER (INPATIENT)
Dept: HOSPITAL 75 - LDRP | Age: 28
LOS: 2 days | Discharge: HOME | End: 2018-02-23
Attending: FAMILY MEDICINE | Admitting: FAMILY MEDICINE
Payer: MEDICAID

## 2018-02-21 VITALS — SYSTOLIC BLOOD PRESSURE: 109 MMHG | DIASTOLIC BLOOD PRESSURE: 55 MMHG

## 2018-02-21 VITALS — DIASTOLIC BLOOD PRESSURE: 66 MMHG | SYSTOLIC BLOOD PRESSURE: 112 MMHG

## 2018-02-21 VITALS — SYSTOLIC BLOOD PRESSURE: 96 MMHG | DIASTOLIC BLOOD PRESSURE: 54 MMHG

## 2018-02-21 VITALS — SYSTOLIC BLOOD PRESSURE: 86 MMHG | DIASTOLIC BLOOD PRESSURE: 51 MMHG

## 2018-02-21 VITALS — SYSTOLIC BLOOD PRESSURE: 100 MMHG | DIASTOLIC BLOOD PRESSURE: 55 MMHG

## 2018-02-21 VITALS — SYSTOLIC BLOOD PRESSURE: 115 MMHG | DIASTOLIC BLOOD PRESSURE: 69 MMHG

## 2018-02-21 VITALS — DIASTOLIC BLOOD PRESSURE: 62 MMHG | SYSTOLIC BLOOD PRESSURE: 106 MMHG

## 2018-02-21 VITALS — DIASTOLIC BLOOD PRESSURE: 53 MMHG | SYSTOLIC BLOOD PRESSURE: 111 MMHG

## 2018-02-21 VITALS — SYSTOLIC BLOOD PRESSURE: 108 MMHG | DIASTOLIC BLOOD PRESSURE: 51 MMHG

## 2018-02-21 VITALS — SYSTOLIC BLOOD PRESSURE: 91 MMHG | DIASTOLIC BLOOD PRESSURE: 53 MMHG

## 2018-02-21 VITALS — SYSTOLIC BLOOD PRESSURE: 101 MMHG | DIASTOLIC BLOOD PRESSURE: 52 MMHG

## 2018-02-21 VITALS — DIASTOLIC BLOOD PRESSURE: 63 MMHG | SYSTOLIC BLOOD PRESSURE: 119 MMHG

## 2018-02-21 VITALS — SYSTOLIC BLOOD PRESSURE: 123 MMHG | DIASTOLIC BLOOD PRESSURE: 75 MMHG

## 2018-02-21 VITALS — DIASTOLIC BLOOD PRESSURE: 56 MMHG | SYSTOLIC BLOOD PRESSURE: 101 MMHG

## 2018-02-21 VITALS — DIASTOLIC BLOOD PRESSURE: 70 MMHG | SYSTOLIC BLOOD PRESSURE: 126 MMHG

## 2018-02-21 VITALS — DIASTOLIC BLOOD PRESSURE: 59 MMHG | SYSTOLIC BLOOD PRESSURE: 104 MMHG

## 2018-02-21 VITALS — SYSTOLIC BLOOD PRESSURE: 118 MMHG | DIASTOLIC BLOOD PRESSURE: 69 MMHG

## 2018-02-21 VITALS — WEIGHT: 272 LBS | HEIGHT: 64 IN | BODY MASS INDEX: 46.44 KG/M2

## 2018-02-21 VITALS — DIASTOLIC BLOOD PRESSURE: 66 MMHG | SYSTOLIC BLOOD PRESSURE: 113 MMHG

## 2018-02-21 VITALS — SYSTOLIC BLOOD PRESSURE: 88 MMHG | DIASTOLIC BLOOD PRESSURE: 47 MMHG

## 2018-02-21 VITALS — SYSTOLIC BLOOD PRESSURE: 96 MMHG | DIASTOLIC BLOOD PRESSURE: 52 MMHG

## 2018-02-21 VITALS — DIASTOLIC BLOOD PRESSURE: 70 MMHG | SYSTOLIC BLOOD PRESSURE: 120 MMHG

## 2018-02-21 VITALS — DIASTOLIC BLOOD PRESSURE: 56 MMHG | SYSTOLIC BLOOD PRESSURE: 126 MMHG

## 2018-02-21 VITALS — DIASTOLIC BLOOD PRESSURE: 72 MMHG | SYSTOLIC BLOOD PRESSURE: 116 MMHG

## 2018-02-21 VITALS — SYSTOLIC BLOOD PRESSURE: 88 MMHG | DIASTOLIC BLOOD PRESSURE: 48 MMHG

## 2018-02-21 VITALS — DIASTOLIC BLOOD PRESSURE: 54 MMHG | SYSTOLIC BLOOD PRESSURE: 97 MMHG

## 2018-02-21 VITALS — DIASTOLIC BLOOD PRESSURE: 67 MMHG | SYSTOLIC BLOOD PRESSURE: 116 MMHG

## 2018-02-21 VITALS — SYSTOLIC BLOOD PRESSURE: 120 MMHG | DIASTOLIC BLOOD PRESSURE: 69 MMHG

## 2018-02-21 VITALS — DIASTOLIC BLOOD PRESSURE: 65 MMHG | SYSTOLIC BLOOD PRESSURE: 123 MMHG

## 2018-02-21 VITALS — DIASTOLIC BLOOD PRESSURE: 47 MMHG | SYSTOLIC BLOOD PRESSURE: 82 MMHG

## 2018-02-21 VITALS — SYSTOLIC BLOOD PRESSURE: 115 MMHG | DIASTOLIC BLOOD PRESSURE: 66 MMHG

## 2018-02-21 VITALS — SYSTOLIC BLOOD PRESSURE: 104 MMHG | DIASTOLIC BLOOD PRESSURE: 55 MMHG

## 2018-02-21 VITALS — DIASTOLIC BLOOD PRESSURE: 40 MMHG | SYSTOLIC BLOOD PRESSURE: 79 MMHG

## 2018-02-21 VITALS — SYSTOLIC BLOOD PRESSURE: 103 MMHG | DIASTOLIC BLOOD PRESSURE: 51 MMHG

## 2018-02-21 VITALS — SYSTOLIC BLOOD PRESSURE: 109 MMHG | DIASTOLIC BLOOD PRESSURE: 58 MMHG

## 2018-02-21 VITALS — DIASTOLIC BLOOD PRESSURE: 47 MMHG | SYSTOLIC BLOOD PRESSURE: 86 MMHG

## 2018-02-21 VITALS — SYSTOLIC BLOOD PRESSURE: 93 MMHG | DIASTOLIC BLOOD PRESSURE: 52 MMHG

## 2018-02-21 VITALS — DIASTOLIC BLOOD PRESSURE: 46 MMHG | SYSTOLIC BLOOD PRESSURE: 94 MMHG

## 2018-02-21 VITALS — DIASTOLIC BLOOD PRESSURE: 48 MMHG | SYSTOLIC BLOOD PRESSURE: 88 MMHG

## 2018-02-21 VITALS — DIASTOLIC BLOOD PRESSURE: 50 MMHG | SYSTOLIC BLOOD PRESSURE: 96 MMHG

## 2018-02-21 VITALS — DIASTOLIC BLOOD PRESSURE: 46 MMHG | SYSTOLIC BLOOD PRESSURE: 84 MMHG

## 2018-02-21 VITALS — DIASTOLIC BLOOD PRESSURE: 58 MMHG | SYSTOLIC BLOOD PRESSURE: 90 MMHG

## 2018-02-21 VITALS — SYSTOLIC BLOOD PRESSURE: 62 MMHG | DIASTOLIC BLOOD PRESSURE: 30 MMHG

## 2018-02-21 VITALS — DIASTOLIC BLOOD PRESSURE: 69 MMHG | SYSTOLIC BLOOD PRESSURE: 116 MMHG

## 2018-02-21 VITALS — SYSTOLIC BLOOD PRESSURE: 84 MMHG | DIASTOLIC BLOOD PRESSURE: 44 MMHG

## 2018-02-21 VITALS — DIASTOLIC BLOOD PRESSURE: 51 MMHG | SYSTOLIC BLOOD PRESSURE: 90 MMHG

## 2018-02-21 VITALS — DIASTOLIC BLOOD PRESSURE: 71 MMHG | SYSTOLIC BLOOD PRESSURE: 129 MMHG

## 2018-02-21 VITALS — SYSTOLIC BLOOD PRESSURE: 107 MMHG | DIASTOLIC BLOOD PRESSURE: 55 MMHG

## 2018-02-21 VITALS — DIASTOLIC BLOOD PRESSURE: 63 MMHG | SYSTOLIC BLOOD PRESSURE: 128 MMHG

## 2018-02-21 VITALS — SYSTOLIC BLOOD PRESSURE: 90 MMHG | DIASTOLIC BLOOD PRESSURE: 49 MMHG

## 2018-02-21 VITALS — DIASTOLIC BLOOD PRESSURE: 66 MMHG | SYSTOLIC BLOOD PRESSURE: 111 MMHG

## 2018-02-21 VITALS — SYSTOLIC BLOOD PRESSURE: 87 MMHG | DIASTOLIC BLOOD PRESSURE: 50 MMHG

## 2018-02-21 VITALS — SYSTOLIC BLOOD PRESSURE: 77 MMHG | DIASTOLIC BLOOD PRESSURE: 40 MMHG

## 2018-02-21 VITALS — SYSTOLIC BLOOD PRESSURE: 65 MMHG | DIASTOLIC BLOOD PRESSURE: 33 MMHG

## 2018-02-21 VITALS — SYSTOLIC BLOOD PRESSURE: 92 MMHG | DIASTOLIC BLOOD PRESSURE: 55 MMHG

## 2018-02-21 VITALS — DIASTOLIC BLOOD PRESSURE: 74 MMHG | SYSTOLIC BLOOD PRESSURE: 118 MMHG

## 2018-02-21 VITALS — DIASTOLIC BLOOD PRESSURE: 56 MMHG | SYSTOLIC BLOOD PRESSURE: 107 MMHG

## 2018-02-21 VITALS — DIASTOLIC BLOOD PRESSURE: 44 MMHG | SYSTOLIC BLOOD PRESSURE: 92 MMHG

## 2018-02-21 VITALS — SYSTOLIC BLOOD PRESSURE: 105 MMHG | DIASTOLIC BLOOD PRESSURE: 59 MMHG

## 2018-02-21 DIAGNOSIS — Z3A.39: ICD-10-CM

## 2018-02-21 LAB
BASOPHILS # BLD AUTO: 0 10^3/UL (ref 0–0.1)
BASOPHILS NFR BLD AUTO: 0 % (ref 0–10)
EOSINOPHIL # BLD AUTO: 0 10^3/UL (ref 0–0.3)
EOSINOPHIL NFR BLD AUTO: 0 % (ref 0–10)
ERYTHROCYTE [DISTWIDTH] IN BLOOD BY AUTOMATED COUNT: 14.1 % (ref 10–14.5)
HCT VFR BLD CALC: 37 % (ref 35–52)
HGB BLD-MCNC: 13.1 G/DL (ref 11.5–16)
LYMPHOCYTES # BLD AUTO: 1.7 X 10^3 (ref 1–4)
LYMPHOCYTES NFR BLD AUTO: 18 % (ref 12–44)
MANUAL DIFFERENTIAL PERFORMED BLD QL: NO
MCH RBC QN AUTO: 33 PG (ref 25–34)
MCHC RBC AUTO-ENTMCNC: 36 G/DL (ref 32–36)
MCV RBC AUTO: 93 FL (ref 80–99)
MONOCYTES # BLD AUTO: 0.8 X 10^3 (ref 0–1)
MONOCYTES NFR BLD AUTO: 8 % (ref 0–12)
NEUTROPHILS # BLD AUTO: 7.3 X 10^3 (ref 1.8–7.8)
NEUTROPHILS NFR BLD AUTO: 74 % (ref 42–75)
PLATELET # BLD: 226 10^3/UL (ref 130–400)
PMV BLD AUTO: 9.9 FL (ref 7.4–10.4)
RBC # BLD AUTO: 3.96 10^6/UL (ref 4.35–5.85)
WBC # BLD AUTO: 9.8 10^3/UL (ref 4.3–11)

## 2018-02-21 PROCEDURE — 85025 COMPLETE CBC W/AUTO DIFF WBC: CPT

## 2018-02-21 PROCEDURE — 86900 BLOOD TYPING SEROLOGIC ABO: CPT

## 2018-02-21 PROCEDURE — 3E033VJ INTRODUCTION OF OTHER HORMONE INTO PERIPHERAL VEIN, PERCUTANEOUS APPROACH: ICD-10-PCS | Performed by: FAMILY MEDICINE

## 2018-02-21 PROCEDURE — 86850 RBC ANTIBODY SCREEN: CPT

## 2018-02-21 PROCEDURE — 0HQ9XZZ REPAIR PERINEUM SKIN, EXTERNAL APPROACH: ICD-10-PCS | Performed by: FAMILY MEDICINE

## 2018-02-21 PROCEDURE — 36415 COLL VENOUS BLD VENIPUNCTURE: CPT

## 2018-02-21 PROCEDURE — 86901 BLOOD TYPING SEROLOGIC RH(D): CPT

## 2018-02-21 RX ADMIN — SODIUM CHLORIDE, SODIUM LACTATE, POTASSIUM CHLORIDE, CALCIUM CHLORIDE, AND DEXTROSE MONOHYDRATE SCH MLS/HR: 600; 310; 30; 20; 5 INJECTION, SOLUTION INTRAVENOUS at 15:27

## 2018-02-21 RX ADMIN — IBUPROFEN SCH MG: 600 TABLET ORAL at 20:52

## 2018-02-21 RX ADMIN — METHYLERGONOVINE SCH MG: 0.2 TABLET ORAL at 18:57

## 2018-02-21 RX ADMIN — SODIUM CHLORIDE, SODIUM LACTATE, POTASSIUM CHLORIDE, CALCIUM CHLORIDE, AND DEXTROSE MONOHYDRATE SCH MLS/HR: 600; 310; 30; 20; 5 INJECTION, SOLUTION INTRAVENOUS at 08:00

## 2018-02-21 NOTE — OB LABOR & DELIVERY RECORD
L&D History


Date of Service


Date of Service:  2018





History


Expected Date of Delivery:  2018


Gestational Age in Weeks:  39


Hx :  5


Hx Para:  2





Pregnancy Complications


Prenatal Events:  Routine Prenatal care


Operative Indications (Cesarea:  N/A-Vaginal Delivery


Intrapartal Events:  None





L&D Stage1


Stage One


Onset of Labor - Date:  2018


Onset of Labor - Time:  07:50





Monitors and Tracing


Fetal Monitor Mode:  Internal


Fetal Heart Rate:  145


Fetal Monitor Accelerations:  Uniform


Fetal Monitor Decelerations:  Variable


Fetal Station:  -1


Long Term Variability:  Average (6-10)


Short Term Variability:  Present


Fetal Presentation:  Vertex


Vital Signs





 VS - Last 72 Hours, by Label








 18





 06:12 07:50 08:40 09:00


 


Temp 97.7 98.0  


 


Pulse 75 72 76 73


 


Resp 18 18  


 


B/P (MAP) 119/63 (81) 118/69 (85) 115/66 (82) 104/55 (71)


 


O2 Delivery Room Air Room Air  Room Air


 


    





 18





 09:10 09:15 09:30 09:40


 


Pulse 78 73 75 75


 


B/P (MAP) 105/59 (74) 104/55 (71) 106/62 (77) 112/66 (81)


 


O2 Delivery  Room Air Room Air 





 18





 09:45 09:50 09:55 10:00


 


Temp  97.8  


 


Pulse 75 80 75 75


 


B/P (MAP) 106/62 (77) 118/74 (89) 113/66 (82) 115/69 (84)


 


Pulse Ox  99 99 98


 


O2 Delivery Room Air Room Air Room Air Room Air


 


    





 18





 10:05 10:10 10:15 10:20


 


Pulse 75 81 84 80


 


B/P (MAP) 120/69 (86) 116/67 (83) 123/65 (84) 123/65 (84)


 


Pulse Ox 98 98 98 98


 


O2 Delivery Room Air Room Air Room Air Room Air





 18





 10:23 10:26 10:30 10:33


 


Pulse 95 90 85 78


 


B/P (MAP) 126/70 (88) 104/59 (74) 109/58 (75) 101/56 (71)


 


Pulse Ox 97 97 98 97


 


O2 Delivery Room Air Room Air Room Air Room Air





 18





 10:36 10:39 10:42 10:45


 


Pulse 77 74 72 74


 


Resp    18


 


B/P (MAP) 101/52 (68) 82/47 (59) 62/30 (41) 65/33 (44)


 


Pulse Ox 98 98 98 98


 


O2 Delivery Room Air Room Air Room Air Room Air





 18





 10:48 10:51 10:54 10:57


 


Pulse 72 81 93 79


 


B/P (MAP) 77/40 (52) 79/40 (53) 86/47 (60) 108/51 (70)


 


Pulse Ox 98 98 98 98


 


O2 Delivery Room Air Room Air Room Air Room Air





 18





 11:00 11:03 11:06 11:09


 


Pulse 91 95 96 79


 


B/P (MAP) 88/47 (61) 88/48 (61) 90/51 (64) 111/53 (72)


 


Pulse Ox 98 98 99 99


 


O2 Delivery Room Air Room Air Room Air Room Air





 18





 11:12 11:15 11:18 11:21


 


Pulse 101 100 107 109


 


B/P (MAP) 100/55 (70) 93/52 (66) 96/52 (67) 96/54 (68)


 


Pulse Ox 99 96 99 99


 


O2 Delivery Room Air Room Air Room Air Room Air





 18





 11:30 11:35 11:45 12:00


 


Pulse 117 113 92 97


 


Resp 18   


 


B/P (MAP) 86/51 (63) 97/54 (68) 86/51 (63) 129/71 (90)


 


Pulse Ox 98 98 100 100


 


O2 Delivery Room Air Room Air Room Air Room Air





 18





 12:15 12:30 12:45 13:00


 


Temp  97.3  


 


Pulse 99 101 93 97


 


B/P (MAP) 128/63 (84) 120/70 (87) 123/75 (91) 111/66 (81)


 


Pulse Ox 96 98 99 99


 


O2 Delivery Room Air Room Air Room Air Room Air


 


    





 18





 13:15 13:30 13:45 14:00


 


Pulse 113 107 101 111


 


B/P (MAP)  87/50 (62) 92/44 (60) 84/46 (59)


 


Pulse Ox 97 99 99 99


 


O2 Delivery Room Air Room Air Room Air Room Air





 18





 14:15 14:30 14:45 15:00


 


Temp    97.5


 


Pulse 106 105 103 93


 


B/P (MAP) 126/56 (79) 107/55 (72) 116/72 (87) 92/55 (67)


 


Pulse Ox 92 99 99 100


 


O2 Delivery Room Air Room Air Room Air Non Rebreather


 


O2 Flow Rate    15.00


 


    





 18  





 15:15 15:30  


 


Pulse 76 88  


 


B/P (MAP)  88/48 (61)  


 


Pulse Ox 100 100  


 


O2 Delivery Non Rebreather Non Rebreather  


 


O2 Flow Rate 15.00 15.00  











Signs of Fetal Distress by FHT


Signs of Distress


no





Rupture of Membranes


Amniotic Membrane Rupture Time:  0748


Amniotic Membrane Fluid Desc.:  Clear





Induction/Anesthesia


Epidural Cath Placement - Time:  1017





L&D Stage2


Stage Two


Stage II Date:  2018


Stage II Time:  16:34





Monitors and Tracing


Fetal Monitor Mode:  Internal


Fetal Heart Rate:  145


Fetal Monitor Accelerations:  Uniform


Fetal Monitor Decelerations:  Variable


Long Term Variability:  Average (6-10)


Short Term Variability:  Present


Fetal Position:  Left Occiput Anterior


Fetal Presentation:  Vertex





Signs of Fetal Distress by FHT


Signs of Distress


no





Cord Descript/Complications


Fetal Cord Vessel Description:  3 Vessels





Delivery Type


Infant Delivery Method:  Spontaneous Vaginal


Anterior Shoulder:  Left





Episiotomy/Perineal Laceration


Laceraction(s)/Extensions:  Yes


Episiotomy Description:  Perineal Extension/lac, 1st degree


Sutures Used:  Vicryl





Condition of Infant


Delivery


1 minute Apgar Comment:  


8


5 minute Apgar Comment:  


9





Condition of Infant


Condition of Infant:  Living


Exam:  No Observed Abnormalities





Resuscitation


Resuscitation:  N/A - Spontaneous Resp





L&D Stage3


Stage Three


Stage III Date:  2018


Stage III Time:  16:39





Pictocin


Pitocin Administration mu/min:  16


Pitocin ml/hr:  16


Pitocin Administration Comment:  


1235 pitocin increased





Placenta Delivery


Placenta Delivery:  Spontaneous





Delivery Summary


Summary


Estimated blood loss (mL):  300





Condition of Delivery


Examined:  Cervix Examined


Post Partum Hemorrhage:  No


Intervention Required


none











JOEY CULVER MD 2018 16:54

## 2018-02-21 NOTE — HISTORY & PHYSICAL-OB
OB - Chief Complaint & HPI


Date/Time


Date of Admission:


Date of Admission:  2018 at 05:46


Time Seen by Provider:  07:20





Chief Complaint/History


OB-Reason for Admission/Chief:  Induction of Labor


Hx :  5


Hx Para:  2


Expected Date of Delivery:  2018


Gestational Age in Weeks:  38


Admission Nurse Assessment Rev:  Yes


History of Labs


GBS negative





Allergies and Home Medications


Allergies


Coded Allergies:  


     No Known Drug Allergies (Unverified , 17)





Home Medications


Nitrofurantoin Monohyd/M-Cryst 100 Mg Capsule, 1 TAB PO BID for 60 Days


   Prescribed by: RICARDO SAL on 1/3/18 2131


Pnv No.122/Iron/Folic Acid 1 Each Tablet, 1 EACH PO, (Reported)





OB - History


Hx of Present Pregnancy


Prenatal Care:  Yes


Ultrasounds:  Normal mid trimester US


Obstetrical Complications:  None


Medical Complications:  None





Obstetrical History


Hx Pregnancy Termination:  Yes


Hx Multiple Gestation:  No


Hx Stillbirth:  No


Hx Pregnancy Complication:  Yes (placenta previa this pregnancy)


Hx Pregnancy Induced Hypertens:  No


Hx Maternal Gestational Diabet:  No





Delivery History


Hx Dystocia:  No


Hx Large For Gestational Age I:  No


Hx Small for Gestational Age I:  No


Hx  Section:  No


Hx Vaginal Delivery Post C-Sec:  No


Hx Blood Disorders:  No





Patient Past Medical History





no chronic medical problems





Social History/Family History


HIV/AIDS:  No


Sexually Transmitted Disease:  No


Alcohol Use:  Denies Use


Recreational Drug Use:  No


2nd Hand Smoke Exposure:  No





Immunizations


Hepatitis A:  No


Hepatitis B:  Yes


Tetanus Booster (TDap):  More than 5yrs


Date of Influenza Vaccine:  Sep 19, 2018





OB - Admission Exam


Physical Exam


HEENT:  Moist Membranes


Heart:  Rhythm Normal


Lungs:  Clear


Abdomen:  Gravid


Cervical Dilatation:  1cm


Effacement:  50%


Station:  -3


Membranes:  Intact


Contractions on Admission:  >10 Minutes Apart


Intensity:  Mild





Mars Scoring Tool (Modified)


Dilation (cm):  1-2cm (1)


Effacement (%):  31-51%  (1)


Fetal Descent/Station:  -3        (0)


Cervix Consistency:  Medium(1)


Cervix Position:  Middle/Mid-Position  (1)


Add 1 point for:  Each previous vaginal delivery (1)


Mars Score:  6





OB - Assessment/Plan/Diagnosis


Assessment


Assessment:  induction of labor





Plan


Plan:  Induction


Induction Method:  AROM


Other Plan


patient desires epidural


pitocin as needed











JOEY CULVER MD 2018 07:24

## 2018-02-21 NOTE — XMS REPORT
Continuity of Care Document

 Created on: 2018



PATRICA JORDAN

External Reference #: [id number]

: 1990

Sex: Female



Demographics







 Address  [address]

[place] [address]

 

 Home Phone  [phone] x

 

 Preferred Language  Unknown

 

 Marital Status  Unknown

 

 Congregation Affiliation  Unknown

 

 Race  Unknown

 

 Ethnic Group  Unknown





Author







 Author  [facility]

 

 Address  Unknown

 

 Phone  Unavailable



              



Allergies

      





 Active            Description            Code            Type            
Severity            Reaction            Onset            Reported/Identified   
         Relationship to Patient            Clinical Status        

 

 Yes            Penicillins                         Drug Allergy               
                                    2010                                  

 

 Yes            Penicillins                         Drug Allergy            N/A
            N/A                         2010                             
     

 

 Yes            Penicillins            [id number]            Drug Allergy      
      Mild            N/A                         2017                   
               

 

 Yes            No Known Drug Allergies            [id number]            Drug 
Allergy            Unknown            N/A                         2017   
                               



                        



Medications

      



There is no data.                  



Problems

      





 Date Dx Coded            Attending            Type            Code            
Diagnosis            Diagnosed By        

 

 2010                                      626.4            IRREGULAR 
MENSTRUAL CYCLE                     

 

 2010                                      780.4            DIZZINESS AND 
GIDDINESS                     

 

 2010                                      780.79            OTHER 
MALAISE AND FATIGUE                     

 

 2010                                      783.1            ABNORMAL 
WEIGHT GAIN                     

 

 2010                                      784.0            HEADACHE     
                

 

 2010            LETICIA SORENSON R                         626.4 
           IRREGULAR MENSTRUAL CYCLE                     

 

 2010            LUMA SORENSONIA R                         780.4 
           DIZZINESS AND GIDDINESS                     

 

 2010            LUMA SORENSONIA R                         780.79
            OTHER MALAISE AND FATIGUE                     

 

 2010            LETICIA SORENSON R                         783.1 
           ABNORMAL WEIGHT GAIN                     

 

 2010            LETICIA SORENSON R                         784.0 
           HEADACHE                     

 

 2010                                      268.9            VITAMIN D 
DEFICIENCY                     

 

 2010            LUMA SORENSONIA R                         268.9 
           VITAMIN D DEFICIENCY                     

 

 2010                                      386.11            BENIGN 
PAROXYSMAL POSITIONAL VERTIGO                     

 

 2010                                      780.52            INSOMNIA, 
UNSPECIFIED                     

 

 2010            LUMA SORENSONIA R                         386.11
            BENIGN PAROXYSMAL POSITIONAL VERTIGO                     

 

 2010            LUMA SORENSONIA R                         780.52
            INSOMNIA, UNSPECIFIED                     

 

 2010                                      278.01            OBESITY 
MORBID BMI >40                     

 

 2010                                      463            TONSILLITIS 
ACUTE                     

 

 2010            LETICIA SORENSON R                         278.01
            OBESITY MORBID BMI >40                     

 

 2010            LETICIA SORENSON R                         463   
         TONSILLITIS ACUTE                     

 

 10/26/2010                                      V25.41            SURVEILLANCE 
OF CONTRACEPTIVE PILL                     

 

 10/26/2010            LETICIA SORENSON R                         V25.41
            SURVEILLANCE OF CONTRACEPTIVE PILL                     

 

 2011                                      305.1            NONDEPENDENT 
TOBACCO USE DISORDER                     

 

 2011                                      789.66            ABDOMINAL 
TENDERNESS EPIGASTRIC                     

 

 2011            LETICIA SORENSON R                         305.1 
           NONDEPENDENT TOBACCO USE DISORDER                     

 

 2011            LETICIA SORENSON R                         789.66
            ABDOMINAL TENDERNESS EPIGASTRIC                     

 

 2011                                      041.86            HELICOBACTER 
PYLORI [H. PYLORI] INFECTION                     

 

 2011                                      571.8            OTHER CHRONIC 
NONALCOHOLIC LIVER DISEASE                     

 

 2011                                      789.00            ABDOMINAL 
PAIN UNSPECIFIED SITE                     

 

 2011            LETICIA SORENSON R                         041.86
            HELICOBACTER PYLORI [H. PYLORI] INFECTION                     

 

 2011            LETICIA SORENSON R                         571.8 
           OTHER CHRONIC NONALCOHOLIC LIVER DISEASE                     

 

 2011            LETICIA SORENSON R                         789.00
            ABDOMINAL PAIN UNSPECIFIED SITE                     

 

 2011                                      616.10            VAGINITIS 
VULVOVAGINITIS UNSPECIFIED                     

 

 2011                                      V72.31            GYN EXAM, 
ROUTINE                     

 

 2011            LETICIA SORENSON R                         616.10
            VAGINITIS VULVOVAGINITIS UNSPECIFIED                     

 

 2011            LETICIA SORENSON R                         V72.31
            GYN EXAM, ROUTINE                     

 

 2011                         Ot            462            ACUTE 
PHARYNGITIS                     

 

 2011                                      V72.42            PREGNANCY 
TEST POSITIVE RESULT                     

 

 2011            LETICIA SORENSON R                         V72.42
            PREGNANCY TEST POSITIVE RESULT                     

 

 2011                         Ot            462            ACUTE 
PHARYNGITIS                     

 

 2011                         Ot            648.93            OTH CURR 
COND-ANTEPARTUM                     

 

 2011                         Ot            599.0            URIN TRACT 
INFECTION NOS                     

 

 2011                         Ot            640.03            THREATEN 
ABORT-ANTEPART                     

 

 2011                         Ot            646.63             INFECTION
-ANTEPARTUM                     

 

 2011                         Ot            789.04            ABDOMINAL 
PAIN, LEFT LOWER QUADRANT                     

 

 2011                         Ot            623.8            NONINFLAM 
DIS VAGINA NEC                     

 

 2011                         Ot            640.03            THREATEN 
ABORT-ANTEPART                     

 

 2011                         Ot            640.03            THREATEN 
ABORT-ANTEPART                     

 

 06/10/2011                         Ot            623.8            NONINFLAM 
DIS VAGINA NEC                     

 

 06/10/2011                         Ot            654.73            ABNORM 
VAGINA-ANTEPARTUM                     

 

 2011                         Ot            634.91            SPON ABORT 
UNCOMPL-INC                     

 

 2011                         Ot            625.3            DYSMENORRHEA
                     

 

 2011                         Ot            625.9            FEM GENITAL 
SYMPTOMS NOS                     

 

 2011                                      625.9            PELVIC PAIN  
                   

 

 2011                                      626.8            DYSFUNCTIONAL 
UTERINE BLEEDING                     

 

 2011            LETICIA SORENSON R                         625.9 
           PELVIC PAIN                     

 

 2011            LETICIA SORENSON R                         626.8 
           DYSFUNCTIONAL UTERINE BLEEDING                     

 

 10/30/2011                         Ot            462            ACUTE 
PHARYNGITIS                     

 

 2013            LETICIA SORENSON R                         462   
         sore throat                     

 

 2013            LETICIA SORENSON R                         786.2 
           cough                     

 

 10/05/2013            ANAIS MIRAMONTES MD            Ot            256.4
            POLYCYSTIC OVARIES                     

 

 10/05/2013            ANIAS MIRAMONTES MD            Ot            625.8
            FEM GENITAL SYMPTOMS NEC                     

 

 10/05/2013            ANAIS MIRAMONTES MD            Ot            626.8
            MENSTRUAL DISORDER NEC                     

 

 10/05/2013            ANAIS MIRAMONTES MD            Ot            
752.35            SEPTATE UTERUS                     

 

 2014            DAHLIA CLAIRE MD            Ot            599.0      
      URIN TRACT INFECTION NOS                     

 

 2014            DAHLIA CLAIRE MD            Ot            646.63     
        INFECTION-ANTEPARTUM                     

 

 2014            DAHLIA CLAIRE MD            Ot            789.09     
       ABDOMINAL PAIN, OTHER SPECIFIED SITE                     

 

 2014            PADMINI BOATENG MD            Ot            623.8   
         NONINFLAM DIS VAGINA NEC                     

 

 2014            PADMINI BOATENG MD            Ot            640.03  
          THREATEN ABORT-ANTEPART                     

 

 2014            PADMINI BOATENG MD            Ot            648.73  
          BONE DISORDER-ANTEPARTUM                     

 

 2014            PADMINI BOATENG MD            Ot            724.2   
         LUMBAGO                     

 

 2014            SRIDHAR JAVIER            Ot            599.0    
        URIN TRACT INFECTION NOS                     

 

 2014            SRIDHAR JAVIER            Ot            616.10   
         VAGINITIS NOS                     

 

 2014            SRIDHAR JAVIER            Ot            623.5    
        NONINFECT VAG LEUKORRHEA                     

 

 2014            SRIDHAR JAVIER            Ot            646.63   
          INFECTION-ANTEPARTUM                     

 

 2014            DAKOTA JOSEPH APRN            Ot            599.0      
      URIN TRACT INFECTION NOS                     

 

 2014            DAKOTA JOSEPH APRN            Ot            641.13     
       PLACEN PREV HEM-ANTEPART                     

 

 2014            DAKOTA JOSEPH APRN            Ot            646.63     
        INFECTION-ANTEPARTUM                     

 

 2014            DAKOTA JOSEPH APRN            Ot            649.53     
       SPOTTING COMP PREGNANCY, ANTEPARTUM COND                     

 

 2014            SRIDHAR JAVIER            Ot            599.0    
        URIN TRACT INFECTION NOS                     

 

 2014            SRIDHAR JAVIER            Ot            623.8    
        NONINFLAM DIS VAGINA NEC                     

 

 2014            SRIDHAR JAVIER            Ot            640.93   
         HEM EARLY PREG-ANTEPART                     

 

 2014            SRIDHAR JAVIER            Ot            646.63   
          INFECTION-ANTEPARTUM                     

 

 05/10/2014            JOEY CULVER MD            Ot            648.73      
      BONE DISORDER-ANTEPARTUM                     

 

 05/10/2014            JOEY CULVER MD            Ot            724.5       
     BACKACHE NOS                     

 

 2014            JOEY CULVER MD            Ot            646.83      
      PREG COMPL NEC-ANTEPART                     

 

 2014            JOEY CULVER MD            Ot            789.06      
      ABDOMINAL PAIN, EPIGASTRIC                     

 

 2014            MEL DOMARIAMA            Ot            655.73        
    DECR FETAL MOVEMNT ANTEPARTUM CONDITION                      

 

 2014            JOEY CULVER MD            Ot            285.1       
     AC POSTHEMORRHAG ANEMIA                     

 

 2014            JOEY CULVER MD            Ot            648.22      
      ANEMIA-DELIVERED W P/P                     

 

 2014            JOEY CULVER MD            Ot            666.02      
      THRD-STAGE HEM-DEL W P/P                     

 

 2014            JOEY CULVER MD            Ot            666.12      
      POSTPA HEM NEC-DEL W P/P                     

 

 2014            JOEY CULVER MD            Ot            V06.1       
     DIPHTHERIA-TETANUS-PERTUSSIS, COMBINED [                     

 

 2014            JOEY CULVER MD            Ot            V27.0       
     DELIVER-SINGLE LIVEBORN                     

 

 2014            SYED MORSE MD            Ot            487.1       
     FLU W RESP MANIFEST NEC                     

 

 2014            MINI MD, SYED A            Ot            780.60      
      FEVER, UNSPECIFIED                     

 

 2014                         Ot            646.83                       
           

 

 2014                         Ot            789.04                       
           

 

 2014                         Ot            640.93                       
           

 

 2014                         Ot            620.2                        
          

 

 2014                         Ot            626.8                        
          

 

 2014                         Ot            629.81                       
           

 

 2014                         Ot            793.5                        
          

 

 2014                         Ot            626.2                        
          

 

 2014                         Ot            V67.09                       
           

 

 2014                         Ot            625.9                        
          

 

 2014            ABIOLA SO, JOEY SUAREZ            Ot            620.2       
                           

 

 2014            ABIOLA SO, JOEY SUAREZ            Ot            626.8       
                           

 

 2014            FE SO, ANAIS HEAD            Ot            285.9
                                  

 

 2014            FE SO, ANAIS HEAD            Ot            620.2
                                  

 

 2014            FE SO, ANAIS HEAD            Ot            626.8
                                  

 

 2014            FE SO, ANAIS HEAD            Ot            
V72.63                                  

 

 2014            FE SO, ANAIS HEAD            Ot            V74.8
                                  

 

 2015            DAHLIA CLAIRE MD            Ot            079.99     
       VIRAL INFECTION NOS                     

 

 2015            DAHLIA CLAIRE MD            Ot            462        
    ACUTE PHARYNGITIS                     

 

 2015            MASOUD GUADARRAMA DO            Ot            462            
ACUTE PHARYNGITIS                     

 

 2015            MASOUD GUADARRAMA DO            Ot            465.9          
  ACUTE URI NOS                     

 

 2015            JOEY CULVER MD            Ot            V28.81      
                            

 

 2015            JOEY CULVER MD            Ot            V28.81      
                            

 

 2015            JOEY CULVER MD            Ot            666.24      
                            

 

 2015            DAKOTA JOSEPH            Ot            L03.031    
        CELLULITIS OF RIGHT TOE                     

 

 2015            DAKOTA JOSEPH APRN            Ot            L60.0      
      INGROWING NAIL                     

 

 2015            MASOUD GUADARRAMA DO            Ot            L03.031        
    CELLULITIS OF RIGHT TOE                     

 

 2015            GAYLE SO, BENTLEY RAMÍREZ            Ot            N97.9       
                           

 

 2015            GAYLE SO, BENTLEY N            Ot            N97.9       
                           

 

 2016                         Ot            646.83            PREG COMPL 
NEC-ANTEPART                     

 

 2016                         Ot            789.04            ABDOMINAL 
PAIN, LEFT LOWER QUADRANT                     

 

 2016                         Ot            640.93            HEM EARLY 
PREG-ANTEPART                     

 

 2016                         Ot            620.2            OVARIAN CYST 
NEC/NOS                     

 

 2016                         Ot            626.8            MENSTRUAL 
DISORDER NEC                     

 

 2016                         Ot            629.81            RECURRENT 
PREGNANCY LOSS WITHOUT CURRENT                     

 

 2016                         Ot            793.5            NOSP (ABN) 
FINDINGS ON RADIOLOGICAL   OT                     

 

 2016                         Ot            626.2            EXCESSIVE 
MENSTRUATION                     

 

 2016                         Ot            V67.09            SURGERY 
FOLLOW-UP, OTHER SURGERY                     

 

 2016                         Ot            625.9            FEM GENITAL 
SYMPTOMS NOS                     

 

 2016            JOEY CULVER MD            Ot            620.2       
     OVARIAN CYST NEC/NOS                     

 

 2016            JOEY CULVER MD            Ot            626.8       
     MENSTRUAL DISORDER NEC                     

 

 2016            FE SO, ANAIS HEAD            Ot            285.9
            ANEMIA NOS                     

 

 2016            FE SO, ANAIS HEAD            Ot            620.2
            OVARIAN CYST NEC/NOS                     

 

 2016            ANAIS MIRAMONTES MD            Ot            626.8
            MENSTRUAL DISORDER NEC                     

 

 2016            FE SO, ANAIS HEAD            Ot            
V72.63            PRE-PROCEDURAL LABORATORY EXAMINATION                     

 

 2016            FE SO, ANAIS HEAD            Ot            V74.8
            SCREEN-BACTERIAL DIS NEC                     

 

 2016            GAYLE SO, BENTLEY RAMÍREZ            Ot            N97.9       
     FEMALE INFERTILITY, UNSPECIFIED                     

 

 2016            MASOUD GUADARRAMA DO            Ot            J02.9          
  ACUTE PHARYNGITIS, UNSPECIFIED                     

 

 2016                         Ot            646.83            PREG COMPL 
NEC-ANTEPART                     

 

 2016                         Ot            789.04            ABDOMINAL 
PAIN, LEFT LOWER QUADRANT                     

 

 2016                         Ot            640.93            HEM EARLY 
PREG-ANTEPART                     

 

 2016                         Ot            620.2            OVARIAN CYST 
NEC/NOS                     

 

 2016                         Ot            626.8            MENSTRUAL 
DISORDER NEC                     

 

 2016                         Ot            629.81            RECURRENT 
PREGNANCY LOSS WITHOUT CURRENT                     

 

 2016                         Ot            793.5            NOSP (ABN) 
FINDINGS ON RADIOLOGICAL   OT                     

 

 2016                         Ot            626.2            EXCESSIVE 
MENSTRUATION                     

 

 2016                         Ot            V67.09            SURGERY 
FOLLOW-UP, OTHER SURGERY                     

 

 2016                         Ot            625.9            FEM GENITAL 
SYMPTOMS NOS                     

 

 2016            ABIOLA SO, JOEY SUAREZ            Ot            620.2       
     OVARIAN CYST NEC/NOS                     

 

 2016            JOEY CULVER MD            Ot            626.8       
     MENSTRUAL DISORDER NEC                     

 

 2016            ANAIS MIRAMONTES MD            Ot            285.9
            ANEMIA NOS                     

 

 2016            ANAIS MIRAMONTES MD            Ot            620.2
            OVARIAN CYST NEC/NOS                     

 

 2016            ANAIS MIRAMONTES MD            Ot            626.8
            MENSTRUAL DISORDER NEC                     

 

 2016            ANAIS MIRAMONTES MD            Ot            
V72.63            PRE-PROCEDURAL LABORATORY EXAMINATION                     

 

 2016            ANAIS MIRAMONTES MD, Ot            V74.8
            SCREEN-BACTERIAL DIS NEC                     

 

 2016            GAYLE SO, BENTLEY RAMÍREZ            Ot            N97.9       
     FEMALE INFERTILITY, UNSPECIFIED                     

 

 2016            MASOUD GUADARRAMA DO            Ot            J02.9          
  ACUTE PHARYNGITIS, UNSPECIFIED                     

 

 2016            MASOUD GUADARRAMA DO            Ot            O20.0          
  THREATENED                      

 

 2016            MASOUD GUADARRAMA DO            Ot            Z3A.01         
   LESS THAN 8 WEEKS GESTATION OF PREGNANCY                     

 

 2016            JOEY CULVER MD            Ot            V28.81      
      ENCOUNTER FOR FETAL ANATOMIC SURVEY                     

 

 2016            JOEY CULVER MD, Ot            V28.81      
      ENCOUNTER FOR FETAL ANATOMIC SURVEY                     

 

 2016            JOEY CULVER MD            Ot            666.24      
      DELAY P/PART HEM-POSTPAR                     

 

 2016                         Ot            646.83            PREG COMPL 
NEC-ANTEPART                     

 

 2016                         Ot            789.04            ABDOMINAL 
PAIN, LEFT LOWER QUADRANT                     

 

 2016                         Ot            640.93            HEM EARLY 
PREG-ANTEPART                     

 

 2016                         Ot            620.2            OVARIAN CYST 
NEC/NOS                     

 

 2016                         Ot            626.8            MENSTRUAL 
DISORDER NEC                     

 

 2016                         Ot            629.81            RECURRENT 
PREGNANCY LOSS WITHOUT CURRENT                     

 

 2016                         Ot            793.5            NOSP (ABN) 
FINDINGS ON RADIOLOGICAL   OT                     

 

 2016                         Ot            626.2            EXCESSIVE 
MENSTRUATION                     

 

 2016                         Ot            V67.09            SURGERY 
FOLLOW-UP, OTHER SURGERY                     

 

 2016                         Ot            625.9            FEM GENITAL 
SYMPTOMS NOS                     

 

 2016            JOEY CULVER MD            Ot            620.2       
     OVARIAN CYST NEC/NOS                     

 

 2016            JOEY CULVER MD            Ot            626.8       
     MENSTRUAL DISORDER NEC                     

 

 2016            ANAIS MIRAMONTES MD            Ot            285.9
            ANEMIA NOS                     

 

 2016            ANAIS MIRAMONTES MD            Ot            620.2
            OVARIAN CYST NEC/NOS                     

 

 2016            ANAIS MIRAMONTES MD            Ot            626.8
            MENSTRUAL DISORDER NEC                     

 

 2016            ANAIS MIRAMONTES MD            Ot            
V72.63            PRE-PROCEDURAL LABORATORY EXAMINATION                     

 

 2016            ANAIS MIRAMONTES MD, Ot            V74.8
            SCREEN-BACTERIAL DIS NEC                     

 

 2016            BENTLEY ARAUJO MD            Ot            N97.9       
     FEMALE INFERTILITY, UNSPECIFIED                     

 

 2016            BENTLEY ARAUJO MD            Ot            N97.9       
     FEMALE INFERTILITY, UNSPECIFIED                     

 

 2016            MASOUD GUADARRAMA DO            Ot            O20.0          
  THREATENED                      

 

 2016            MASOUD GUADARRAMA DO            Ot            Z3A.01         
   LESS THAN 8 WEEKS GESTATION OF PREGNANCY                     

 

 2017            DAHLIA CLAIRE MD            Ot            N92.0      
      EXCESSIVE AND FREQUENT MENSTRUATION WITH                     

 

 2017            DAHLIA CLAIRE MD            Ot            N93.9      
      ABNORMAL UTERINE AND VAGINAL BLEEDING, U                     

 

 2017            DAHLIA CLAIRE MD            Ot            N92.0      
      EXCESSIVE AND FREQUENT MENSTRUATION WITH                     

 

 2017            DAHLIA CLAIRE MD            Ot            N93.9      
      ABNORMAL UTERINE AND VAGINAL BLEEDING, U                     

 

 2017            DAKOTA JOSEPH            Ot            N93.8      
      OTHER SPECIFIED ABNORMAL UTERINE AND VAG                     

 

 2017            DAKOTA JOSEPH            Ot            R10.30     
       LOWER ABDOMINAL PAIN, UNSPECIFIED                     

 

 2017            JOEY CULVER MD            Ot            N93.9       
     ABNORMAL UTERINE AND VAGINAL BLEEDING, U                     

 

 2017            JOEY CULVER MD            Ot            N93.9       
     ABNORMAL UTERINE AND VAGINAL BLEEDING, U                     

 

 2017            DAHLIA CLAIRE MD            Ot            G89.18     
       OTHER ACUTE POSTPROCEDURAL PAIN                     

 

 2017            DAHLIA CLAIRE MD            Ot            Z87.891    
        PERSONAL HISTORY OF NICOTINE DEPENDENCE                     

 

 2017            DAHLIA CLAIRE MD            Ot            G89.18     
       OTHER ACUTE POSTPROCEDURAL PAIN                     

 

 2017            DAHLIA CLAIRE MD            Ot            Z87.891    
        PERSONAL HISTORY OF NICOTINE DEPENDENCE                     

 

 2017            LOY JOYCEP            Ot            G47.10   
         HYPERSOMNIA, UNSPECIFIED                     

 

 2017            LOY JOYCEP            Ot            G47.33   
         OBSTRUCTIVE SLEEP APNEA (ADULT) (PEDIATR                     

 

 2017            DAHLIA CLAIRE MD            Ot            O9A.211    
        INJ/POISN/OTH CONSEQ OF EXTERNAL CAUSES                      

 

 2017            DAHLIA CLAIRE MD            Ot            R10.32     
       LEFT LOWER QUADRANT PAIN                     

 

 2017            DAHLIA CLAIRE MD            Ot            Z3A.01     
       LESS THAN 8 WEEKS GESTATION OF PREGNANCY                     

 

 2017            DAHLIA CLAIRE MD            Ot            Z87.42     
       PERSONAL HISTORY OF OTH DISEASES OF THE                      

 

 2017            DAHLIA CLAIRE MD            Ot            Z87.891    
        PERSONAL HISTORY OF NICOTINE DEPENDENCE                     

 

 2017            DAHLIA CLAIRE MD            Ot            Z88.2      
      ALLERGY STATUS TO SULFONAMIDES STATUS                     

 

 2017            DAHLIA CLAIRE MD, Ot            O9A.211    
        INJ/POISN/OTH CONSEQ OF EXTERNAL CAUSES                      

 

 2017            DAHLIA CLAIRE MD            Ot            R10.32     
       LEFT LOWER QUADRANT PAIN                     

 

 2017            DAHLIA CLAIRE MD            Ot            Z3A.01     
       LESS THAN 8 WEEKS GESTATION OF PREGNANCY                     

 

 2017            DAHLIA CLAIRE MD            Ot            Z87.42     
       PERSONAL HISTORY OF OTH DISEASES OF THE                      

 

 2017            DAHLIA CLAIRE MD            Ot            Z87.891    
        PERSONAL HISTORY OF NICOTINE DEPENDENCE                     

 

 2017            DAHLIA CLAIRE MD            Ot            Z88.2      
      ALLERGY STATUS TO SULFONAMIDES STATUS                     

 

 2017            JOEY CULVER MD            Ot            Z36         
   ENCOUNTER FOR  SCREENING OF MOT                     

 

 2017            JOEY CULVER MD, Ot            Z3A.01      
      LESS THAN 8 WEEKS GESTATION OF PREGNANCY                     

 

 2017            JOEY CULVER MD, Ot            O76         
   ABNLT IN FETAL HEART RATE AND RHYTHM COM                     

 

 2017            JOEY CULVER MD, Ot            Z3A.12      
      12 WEEKS GESTATION OF PREGNANCY                     

 

 2017            JOEY CULVER MD            Ot            O20.8       
     OTHER HEMORRHAGE IN EARLY PREGNANCY                     

 

 2017            JOEY CULVER MD, Ot            Z3A.11      
      11 WEEKS GESTATION OF PREGNANCY                     

 

 2017            JOEY CULVER MD            Ot            O76         
   ABNLT IN FETAL HEART RATE AND RHYTHM COM                     

 

 2017            ABIOLA SO, JOEY SUAREZ            Ot            Z3A.12      
      12 WEEKS GESTATION OF PREGNANCY                     

 

 2017            LIV MONROY MD            Ot            O20.9 
           HEMORRHAGE IN EARLY PREGNANCY, UNSPECIFI                     

 

 2017            LIV MONROY MD            Ot            O23.42
            UNSP INFCT OF URINARY TRACT IN PREGNANCY                     

 

 2017            LIV MONROY MD            Ot            
O26.852            SPOTTING COMPLICATING PREGNANCY, SECOND                      

 

 2017            LIV MONROY MD            Ot            O44.02
            COMPLETE PLACENTA PREVIA NOS OR WITHOUT                      

 

 2017            LIV MONROY MD            Ot            Z3A.16
            16 WEEKS GESTATION OF PREGNANCY                     

 

 2017            LIV MONROY MD            Ot            Z82.49
            FAMILY HX OF ISCHEM HEART DIS AND OTH DI                     

 

 2017            LIV MONROY MD            Ot            Z87.19
            PERSONAL HISTORY OF OTHER DISEASES OF TH                     

 

 2017            LIV MONROY MD            Ot            
Z87.448            PERSONAL HISTORY OF OTHER DISEASES OF                      

 

 2017            LIV MONROY MD            Ot            Z87.59
            PERSONAL HISTORY OF COMP OF PREG, CHLDBR                     

 

 2017            LIV MONROY MD            Ot            
Z87.891            PERSONAL HISTORY OF NICOTINE DEPENDENCE                     

 

 2017            LIV MONROY MD            Ot            O20.9 
           HEMORRHAGE IN EARLY PREGNANCY, UNSPECIFI                     

 

 2017            LIV MONROY MD            Ot            O23.42
            UNSP INFCT OF URINARY TRACT IN PREGNANCY                     

 

 2017            LIV MONROY MD            Ot            
O26.852            SPOTTING COMPLICATING PREGNANCY, SECOND                      

 

 2017            LIV MONROY MD            Ot            O44.02
            COMPLETE PLACENTA PREVIA NOS OR WITHOUT                      

 

 2017            LIV MONROY MD            Ot            Z3A.16
            16 WEEKS GESTATION OF PREGNANCY                     

 

 2017            LIV MONROY MD            Ot            Z82.49
            FAMILY HX OF ISCHEM HEART DIS AND OTH DI                     

 

 2017            LIV MONROY MD            Ot            Z87.19
            PERSONAL HISTORY OF OTHER DISEASES OF                      

 

 2017            LIV MONROY MD            Ot            
Z87.448            PERSONAL HISTORY OF OTHER DISEASES OF UR                     

 

 2017            LIV MONROY MD            Ot            Z87.59
            PERSONAL HISTORY OF COMP OF PREG, CHLDBR                     

 

 2017            LIV MONROY MD, Ot            
Z87.891            PERSONAL HISTORY OF NICOTINE DEPENDENCE                     

 

 10/08/2017            JOEY CULVER MD, Ot            Z36.3       
     ENCOUNTER FOR  SCREENING FOR MA                     

 

 10/08/2017            JOEY CULVER MD, Ot            Z3A.19      
      19 WEEKS GESTATION OF PREGNANCY                     

 

 10/24/2017            JOEY CULVER MD, Ot            Z36.3       
     ENCOUNTER FOR  SCREENING FOR MA                     

 

 10/24/2017            JOEY CULVER MD, Ot            Z3A.19      
      19 WEEKS GESTATION OF PREGNANCY                     

 

 2017            MORENO LEAL DOA LEIGHA            Ot            K52.9         
   NONINFECTIVE GASTROENTERITIS AND COLITIS                     

 

 2017            MORENO LEAL DOA K            Ot            O99.612       
     DISEASES OF THE DGSTV SYS COMP PREGNANCY                     

 

 2017            MORENO LEAL DOA K            Ot            Z3A.24        
    24 WEEKS GESTATION OF PREGNANCY                     

 

 2017            MARIAMA LEAL DO            Ot            K52.9         
   NONINFECTIVE GASTROENTERITIS AND COLITIS                     

 

 2017            MORENO LEAL DOA K            Ot            O99.612       
     DISEASES OF THE DGSTV SYS COMP PREGNANCY                     

 

 2017            MARIAMA LEAL DO K            Ot            Z3A.24        
    24 WEEKS GESTATION OF PREGNANCY                     

 

 2018            JOEY CULVER MD, Ot            Z34.90      
      ENCNTR FOR SUPRVSN OF NORMAL PREGNANCY,                      

 

 2018            JOEY CULVER MD, Ot            Z3A.00      
      WEEKS OF GESTATION OF PREGNANCY NOT SPEC                     

 

 2018            JOEY CULVER MD, Ot            O23.43      
      UNSP INFCT OF URINARY TRACT IN PREGNANCY                     

 

 2018            JOEY CULVER MD, Ot            Z3A.32      
      32 WEEKS GESTATION OF PREGNANCY                     

 

 2018            JOEY CULVER MD, Ot            O23.43      
      UNSP INFCT OF URINARY TRACT IN PREGNANCY                     

 

 2018            JOEY CULVER MD, Ot            Z3A.32      
      32 WEEKS GESTATION OF PREGNANCY                     

 

 2018            JOEY CULVER MD, Ot            N13.30      
      UNSPECIFIED HYDRONEPHROSIS                     

 

 2018            ABIOLA SO, JOEY SUAREZ Ot            O26.853     
       SPOTTING COMPLICATING PREGNANCY, THIRD T                     

 

 2018            ABIOLA SO, JOEY SUAREZ Ot            Z3A.36      
      36 WEEKS GESTATION OF PREGNANCY                     



                                                                               
                                                                               
                                                                               
                                                                               
                                                                               
                                                                               
                                                                  



Procedures

      





 Code            Description            Performed By            Performed On   
     

 

             [id number]                                  [facility]  (IN-HOUSE)        
                           2013        

 

             73.4                                                              
       09/15/2014        

 

             73.6                                                              
       09/15/2014        

 

             75.7                                                              
       09/15/2014        



                        



Results

      





 Test            Result            Range        









 Complete blood count (CBC) with automated white blood cell (WBC) differential 
- 17 06:50         









 Blood leukocytes automated count (number/volume)            7.2 10*3/uL       
     4.3-11.0        

 

 Blood erythrocytes automated count (number/volume)            4.23 10*6/uL    
        4.35-5.85        

 

 Venous blood hemoglobin measurement (mass/volume)            13.6 g/dL        
    11.5-16.0        

 

 Blood hematocrit (volume fraction)            39 %            35-52        

 

 Automated erythrocyte mean corpuscular volume            92 [foz_us]          
  80-99        

 

 Automated erythrocyte mean corpuscular hemoglobin (mass per erythrocyte)      
      32 pg            25-34        

 

 Automated erythrocyte mean corpuscular hemoglobin concentration measurement (
mass/volume)            35 g/dL            32-36        

 

 Automated erythrocyte distribution width ratio            12.8 %            
10.0-14.5        

 

 Automated blood platelet count (count/volume)            305 10*3/uL          
  130-400        

 

 Automated blood platelet mean volume measurement            9.4 [foz_us]      
      7.4-10.4        

 

 Automated blood neutrophils/100 leukocytes            52 %            42-75   
     

 

 Automated blood lymphocytes/100 leukocytes            39 %            12-44   
     

 

 Blood monocytes/100 leukocytes            8 %            0-12        

 

 Automated blood eosinophils/100 leukocytes            1 %            0-10     
   

 

 Automated blood basophils/100 leukocytes            0 %            0-10        

 

 Blood neutrophils automated count (number/volume)            3.8 10*3         
   1.8-7.8        

 

 Blood lymphocytes automated count (number/volume)            2.8 10*3         
   1.0-4.0        

 

 Blood monocytes automated count (number/volume)            0.6 10*3            
0.0-1.0        

 

 Automated eosinophil count            0.1 10*3/uL            0.0-0.3        

 

 Automated blood basophil count (count/volume)            0.0 10*3/uL          
  0.0-0.1        









 Serum or plasma choriogonadotropin (pregnancy test) detection - 17 06:50
         









 Serum or plasma choriogonadotropin (pregnancy test) detection            
NEGATIVE             NEGATIVE        









 Blood CBC with ordered manual differential panel - 17 16:55         









 Blood leukocytes automated count (number/volume)            8.8 10*3/uL       
     4.3-11.0        

 

 Blood erythrocytes automated count (number/volume)            4.49 10*6/uL    
        4.35-5.85        

 

 Venous blood hemoglobin measurement (mass/volume)            14.3 g/dL        
    11.5-16.0        

 

 Blood hematocrit (volume fraction)            41 %            35-52        

 

 Automated erythrocyte mean corpuscular volume            91 [foz_us]          
  80-99        

 

 Automated erythrocyte mean corpuscular hemoglobin (mass per erythrocyte)      
      32 pg            25-34        

 

 Automated erythrocyte mean corpuscular hemoglobin concentration measurement (
mass/volume)            35 g/dL            32-36        

 

 Automated erythrocyte distribution width ratio            12.5 %            
10.0-14.5        

 

 Automated blood platelet count (count/volume)            328 10*3/uL          
  130-400        

 

 Automated blood platelet mean volume measurement            9.6 [foz_us]      
      7.4-10.4        

 

 Automated blood neutrophils/100 leukocytes            59 %            42-75   
     

 

 Automated blood lymphocytes/100 leukocytes            34 %            12-44   
     

 

 Blood monocytes/100 leukocytes            7 %            NRG        

 

 Automated blood eosinophils/100 leukocytes            1 %            0-10     
   

 

 Automated blood basophils/100 leukocytes            0 %            0-10        

 

 Blood neutrophils automated count (number/volume)            5.2 10*3         
   1.8-7.8        

 

 Blood lymphocytes automated count (number/volume)            3.0 10*3         
   1.0-4.0        

 

 Blood monocytes automated count (number/volume)            0.6 10*3            
0.0-1.0        

 

 Automated eosinophil count            0.1 10*3/uL            0.0-0.3        

 

 Automated blood basophil count (count/volume)            0.0 10*3/uL          
  0.0-0.1        

 

 Manual blood segmented neutrophils/100 leukocytes            51 %            
NRG        

 

 Manual blood lymphocytes/100 leukocytes            42 %            NRG        

 

 Blood erythrocyte morphology finding identification            NORMAL         
    NRG        









 Complete urinalysis with reflex to culture - 17 16:55         









 Urine color determination            YELLOW             NRG        

 

 Urine clarity determination            SLIGHTLY CLOUDY             NRG        

 

 Urine pH measurement by test strip            6             5-9        

 

 Specific gravity of urine by test strip            1.020             1.016-
1.022        

 

 Urine protein assay by test strip, semi-quantitative            1+             
NEGATIVE        

 

 Urine glucose detection by automated test strip            NEGATIVE           
  NEGATIVE        

 

 Erythrocytes detection in urine sediment by light microscopy            5+    
         NEGATIVE        

 

 Urine ketones detection by automated test strip            NEGATIVE           
  NEGATIVE        

 

 Urine nitrite detection by test strip            NEGATIVE             NEGATIVE
        

 

 Urine total bilirubin detection by test strip            NEGATIVE             
NEGATIVE        

 

 Urine urobilinogen measurement by automated test strip (mass/volume)          
  NORMAL             NORMAL        

 

 Urine leukocyte esterase detection by dipstick            2+             
NEGATIVE        

 

 Automated urine sediment erythrocyte count by microscopy (number/high power 
field)            TNTC             NRG        

 

 Automated urine sediment leukocyte count by microscopy (number/high power field
)            RARE             NRG        

 

 Bacteria detection in urine sediment by light microscopy            NEGATIVE  
           NRG        

 

 Squamous epithelial cells detection in urine sediment by light microscopy     
       5-10             NRG        

 

 Crystals detection in urine sediment by light microscopy            NONE      
       NRG        

 

 Casts detection in urine sediment by light microscopy            NONE         
    NRG        

 

 Mucus detection in urine sediment by light microscopy            NEGATIVE     
        NRG        

 

 Complete urinalysis with reflex to culture            NO             NRG      
  









 Comprehensive metabolic panel - 17 16:55         









 Serum or plasma sodium measurement (moles/volume)            138 mmol/L       
     135-145        

 

 Serum or plasma potassium measurement (moles/volume)            3.9 mmol/L    
        3.6-5.0        

 

 Serum or plasma chloride measurement (moles/volume)            107 mmol/L     
               

 

 Carbon dioxide            20 mmol/L            21-32        

 

 Serum or plasma anion gap determination (moles/volume)            11 mmol/L   
         5-14        

 

 Serum or plasma urea nitrogen measurement (mass/volume)            11 mg/dL   
         7-18        

 

 Serum or plasma creatinine measurement (mass/volume)            0.67 mg/dL    
        0.60-1.30        

 

 Serum or plasma urea nitrogen/creatinine mass ratio            16             
NRG        

 

 Serum or plasma creatinine measurement with calculation of estimated 
glomerular filtration rate            >             NRG        

 

 Serum or plasma glucose measurement (mass/volume)            83 mg/dL         
           

 

 Serum or plasma calcium measurement (mass/volume)            9.4 mg/dL        
    8.5-10.1        

 

 Serum or plasma total bilirubin measurement (mass/volume)            0.2 mg/dL
            0.1-1.0        

 

 Serum or plasma alkaline phosphatase measurement (enzymatic activity/volume)  
          59 U/L                    

 

 Serum or plasma aspartate aminotransferase measurement (enzymatic activity/
volume)            15 U/L            5-34        

 

 Serum or plasma alanine aminotransferase measurement (enzymatic activity/volume
)            18 U/L            0-55        

 

 Serum or plasma protein measurement (mass/volume)            7.4 g/dL         
   6.4-8.2        

 

 Serum or plasma albumin measurement (mass/volume)            4.1 g/dL         
   3.2-4.5        









 Complete urinalysis with reflex to culture - 17 07:00         









 Urine color determination            YELLOW             NRG        

 

 Urine clarity determination            CLEAR             NRG        

 

 Urine pH measurement by test strip            6             5-9        

 

 Specific gravity of urine by test strip            1.025             1.016-
1.022        

 

 Urine protein assay by test strip, semi-quantitative            NEGATIVE      
       NEGATIVE        

 

 Urine glucose detection by automated test strip            NEGATIVE           
  NEGATIVE        

 

 Erythrocytes detection in urine sediment by light microscopy            
NEGATIVE             NEGATIVE        

 

 Urine ketones detection by automated test strip            NEGATIVE           
  NEGATIVE        

 

 Urine nitrite detection by test strip            NEGATIVE             NEGATIVE
        

 

 Urine total bilirubin detection by test strip            NEGATIVE             
NEGATIVE        

 

 Urine urobilinogen measurement by automated test strip (mass/volume)          
  NORMAL             NORMAL        

 

 Urine leukocyte esterase detection by dipstick            1+             
NEGATIVE        

 

 Automated urine sediment erythrocyte count by microscopy (number/high power 
field)            NONE             NRG        

 

 Automated urine sediment leukocyte count by microscopy (number/high power field
)             [HPF]            NRG        

 

 Bacteria detection in urine sediment by light microscopy            TRACE     
        NRG        

 

 Squamous epithelial cells detection in urine sediment by light microscopy     
       RARE             NRG        

 

 Crystals detection in urine sediment by light microscopy            NONE      
       NRG        

 

 Casts detection in urine sediment by light microscopy            NONE         
    NRG        

 

 Mucus detection in urine sediment by light microscopy            NEGATIVE     
        NRG        

 

 Complete urinalysis with reflex to culture            NO             NRG      
  









 Serum or plasma choriogonadotropin measurement (units/volume) - 17 07:10
         









 Serum or plasma choriogonadotropin measurement (units/volume)            1545 m
[iU]/mL            <5        









 Complete urinalysis with reflex to culture - 17 17:45         









 Urine color determination            YELLOW             NRG        

 

 Urine clarity determination            SLIGHTLY CLOUDY             NRG        

 

 Urine pH measurement by test strip            7             5-9        

 

 Specific gravity of urine by test strip            1.015             1.016-
1.022        

 

 Urine protein assay by test strip, semi-quantitative            NEGATIVE      
       NEGATIVE        

 

 Urine glucose detection by automated test strip            NEGATIVE           
  NEGATIVE        

 

 Erythrocytes detection in urine sediment by light microscopy            
NEGATIVE             NEGATIVE        

 

 Urine ketones detection by automated test strip            NEGATIVE           
  NEGATIVE        

 

 Urine nitrite detection by test strip            NEGATIVE             NEGATIVE
        

 

 Urine total bilirubin detection by test strip            NEGATIVE             
NEGATIVE        

 

 Urine urobilinogen measurement by automated test strip (mass/volume)          
  NORMAL             NORMAL        

 

 Urine leukocyte esterase detection by dipstick            2+             
NEGATIVE        

 

 Automated urine sediment erythrocyte count by microscopy (number/high power 
field)            NONE             NRG        

 

 Automated urine sediment leukocyte count by microscopy (number/high power field
)             [HPF]            NRG        

 

 Bacteria detection in urine sediment by light microscopy            FEW       
      NRG        

 

 Squamous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

## 2018-02-22 VITALS — SYSTOLIC BLOOD PRESSURE: 95 MMHG | DIASTOLIC BLOOD PRESSURE: 60 MMHG

## 2018-02-22 VITALS — SYSTOLIC BLOOD PRESSURE: 93 MMHG | DIASTOLIC BLOOD PRESSURE: 60 MMHG

## 2018-02-22 VITALS — SYSTOLIC BLOOD PRESSURE: 98 MMHG | DIASTOLIC BLOOD PRESSURE: 60 MMHG

## 2018-02-22 VITALS — SYSTOLIC BLOOD PRESSURE: 98 MMHG | DIASTOLIC BLOOD PRESSURE: 67 MMHG

## 2018-02-22 VITALS — DIASTOLIC BLOOD PRESSURE: 73 MMHG | SYSTOLIC BLOOD PRESSURE: 104 MMHG

## 2018-02-22 LAB
BASOPHILS # BLD AUTO: 0 10^3/UL (ref 0–0.1)
BASOPHILS NFR BLD AUTO: 0 % (ref 0–10)
EOSINOPHIL # BLD AUTO: 0 10^3/UL (ref 0–0.3)
EOSINOPHIL NFR BLD AUTO: 0 % (ref 0–10)
ERYTHROCYTE [DISTWIDTH] IN BLOOD BY AUTOMATED COUNT: 14.1 % (ref 10–14.5)
HCT VFR BLD CALC: 31 % (ref 35–52)
HGB BLD-MCNC: 10.8 G/DL (ref 11.5–16)
LYMPHOCYTES # BLD AUTO: 2.1 X 10^3 (ref 1–4)
LYMPHOCYTES NFR BLD AUTO: 18 % (ref 12–44)
MANUAL DIFFERENTIAL PERFORMED BLD QL: NO
MCH RBC QN AUTO: 33 PG (ref 25–34)
MCHC RBC AUTO-ENTMCNC: 35 G/DL (ref 32–36)
MCV RBC AUTO: 95 FL (ref 80–99)
MONOCYTES # BLD AUTO: 1.1 X 10^3 (ref 0–1)
MONOCYTES NFR BLD AUTO: 9 % (ref 0–12)
NEUTROPHILS # BLD AUTO: 8.8 X 10^3 (ref 1.8–7.8)
NEUTROPHILS NFR BLD AUTO: 73 % (ref 42–75)
PLATELET # BLD: 208 10^3/UL (ref 130–400)
PMV BLD AUTO: 9.9 FL (ref 7.4–10.4)
RBC # BLD AUTO: 3.29 10^6/UL (ref 4.35–5.85)
WBC # BLD AUTO: 12.1 10^3/UL (ref 4.3–11)

## 2018-02-22 RX ADMIN — Medication SCH ML: at 00:13

## 2018-02-22 RX ADMIN — VITAMIN A ACETATE, .BETA.-CAROTENE, ASCORBIC ACID, CHOLECALCIFEROL, .ALPHA.-TOCOPHEROL ACETATE, DL-, THIAMINE MONONITRATE, RIBOFLAVIN, NIACINAMIDE, PYRIDOXINE HYDROCHLORIDE, FOLIC ACID, CYANOCOBALAMIN, CALCIUM CARBONATE, FERROUS FUMARATE, ZINC OXIDE, AND CUPRIC OXIDE SCH EA: 2000; 2000; 120; 400; 22; 1.84; 3; 20; 10; 1; 12; 200; 27; 25; 2 TABLET ORAL at 08:52

## 2018-02-22 RX ADMIN — Medication SCH ML: at 22:01

## 2018-02-22 RX ADMIN — METHYLERGONOVINE SCH MG: 0.2 TABLET ORAL at 05:02

## 2018-02-22 RX ADMIN — IBUPROFEN SCH MG: 600 TABLET ORAL at 13:20

## 2018-02-22 RX ADMIN — IBUPROFEN SCH MG: 600 TABLET ORAL at 20:11

## 2018-02-22 RX ADMIN — IBUPROFEN SCH MG: 600 TABLET ORAL at 05:02

## 2018-02-22 RX ADMIN — METHYLERGONOVINE SCH MG: 0.2 TABLET ORAL at 00:53

## 2018-02-22 RX ADMIN — Medication SCH ML: at 22:00

## 2018-02-22 NOTE — PROGRESS NOTE (SOAP)
Subjective


Date Seen by Provider:  2018


Time Seen by Provider:  07:40


Subjective/Events-last exam


Patient has no complaints of any abnormal vaginal bleeding.





Objective


Exam





Vital Signs








  Date Time  Temp Pulse Resp B/P (MAP) Pulse Ox O2 Delivery O2 Flow Rate FiO2


 


18 14:40 97.9 79 18 98/60 (73) 100 Room Air  


 


18 09:00 97.8 55 18 104/73 (83) 98 Room Air  


 


18 05:02 98.1 77 18 93/60 (71) 98 Room Air  


 


18 00:50 98.5 85 18 95/60 (72) 98 Room Air  


 


18 20:30 98.0 92 18 91/53 (66) 97 Room Air  


 


18 19:30  79 18 90/58 (69) 97 Room Air  


 


18 19:00  90 18 91/53 (66) 98 Room Air  


 


18 18:40  83 18 96/50 (65) 98 Room Air  


 


18 18:26  85 18 103/51 (68)  Room Air  


 


18 18:10  96 18 94/46 (62)  Room Air  


 


18 17:55  112 18 107/56 (73)  Room Air  


 


18 17:41  83 18 84/44 (57)  Room Air  


 


18 17:26  90 18 90/49 (63)  Room Air  














I & O 


 


 18





 07:00


 


Intake Total 3000 ml


 


Balance 3000 ml





Capillary Refill :


General Appearance:  No Apparent Distress


Respiratory:  Lungs Clear


Cardiovascular:  Regular Rate, Rhythm


Gastrointestinal:  soft (with uterus firm)





Results


Lab


Laboratory Tests


18 05:35: 


White Blood Count 12.1H, Red Blood Count 3.29L, Hemoglobin 10.8L, Hematocrit 31L

, Mean Corpuscular Volume 95, Mean Corpuscular Hemoglobin 33, Mean Corpuscular 

Hemoglobin Concent 35, Red Cell Distribution Width 14.1, Platelet Count 208, 

Mean Platelet Volume 9.9, Neutrophils (%) (Auto) 73, Lymphocytes (%) (Auto) 18, 

Monocytes (%) (Auto) 9, Eosinophils (%) (Auto) 0, Basophils (%) (Auto) 0, 

Neutrophils # (Auto) 8.8H, Lymphocytes # (Auto) 2.1, Monocytes # (Auto) 1.1H, 

Eosinophils # (Auto) 0.0, Basophils # (Auto) 0.0





Assessment/Plan


Assessment/Plan


Assess & Plan/Chief Complaint


1.  Status post spontaneous vaginal delivery


-routine postpartum care orders


-plan on dismissal in the morning of 2018





Clinical Quality Measures


DVT/VTE Risk/Contraindication:


Risk Factor Score Per Nursin


RFS Level Per Nursing on Admit:  2=Moderate











JOEY CULVER MD 2018 17:18

## 2018-02-22 NOTE — ANESTHESIA-REGIONAL POST-OP
Regional


Patient Condition


Mental Status:  Alert, Oriented x3


Circulation:  Same as Pre-Op


Headache:  Absent


Sensation:  Full Recovery


Motor Block:  Absent





Post Op Complications


Complications


None





Follow Up Care/Instructions


Patient Instructions


None needed.





Anesthesia/Patient Condition


Patient is doing well, no complaints, stable vital signs, no apparent adverse 

anesthesia problems.   


No complications reported per nursing.











JAVIER PADILLA CRNA Feb 22, 2018 14:10

## 2018-02-23 VITALS — DIASTOLIC BLOOD PRESSURE: 45 MMHG | SYSTOLIC BLOOD PRESSURE: 95 MMHG

## 2018-02-23 VITALS — DIASTOLIC BLOOD PRESSURE: 41 MMHG | SYSTOLIC BLOOD PRESSURE: 92 MMHG

## 2018-02-23 RX ADMIN — Medication SCH ML: at 06:32

## 2018-02-23 RX ADMIN — IBUPROFEN SCH MG: 600 TABLET ORAL at 02:44

## 2018-02-23 RX ADMIN — IBUPROFEN SCH MG: 600 TABLET ORAL at 08:40

## 2018-02-23 RX ADMIN — VITAMIN A ACETATE, .BETA.-CAROTENE, ASCORBIC ACID, CHOLECALCIFEROL, .ALPHA.-TOCOPHEROL ACETATE, DL-, THIAMINE MONONITRATE, RIBOFLAVIN, NIACINAMIDE, PYRIDOXINE HYDROCHLORIDE, FOLIC ACID, CYANOCOBALAMIN, CALCIUM CARBONATE, FERROUS FUMARATE, ZINC OXIDE, AND CUPRIC OXIDE SCH EA: 2000; 2000; 120; 400; 22; 1.84; 3; 20; 10; 1; 12; 200; 27; 25; 2 TABLET ORAL at 06:14

## 2018-02-23 NOTE — DISCHARGE INST-WOMEN'S SERVICE
Discharge Inst-Women's Serv


Depart Medication/Instructions


New, Converted or Re-Newed RX:  Transmitted to Pharmacy





Consults/Follow Up


Additional Follow Up:  Yes (With Dr. Culver in 6 weeks)





Activity


Driving Instructions:  You May Drive


Nothing Inside Vagina:  No [place] (For 6 weeks)





Diet


Discharge Diet:  Regular Diet


Return to The Hospital For:  


As below


Symptoms to Report to :  Swelling Increased, Bleeding Excessive, Fever Over 

101 Degrees F, Vaginal Discharge Foul


For Any Problems or Questions:  Contact Your Physician











JOEY CULVER MD Feb 23, 2018 07:35

## 2018-02-23 NOTE — DISCHARGE SUMMARY
Diagnosis/Chief Complaint


Date of Admission


2018 at 05:46


Date of Discharge


2018


Discharge Date:  2018


Discharge Time:  07:30


Admission Diagnosis


Admission Diagnosis


1.  Intrauterine pregnancy at 39 weeks gestation





Discharge Diagnosis


1.  Intrauterine pregnancy at 39 weeks gestation





Reason Hospital Visit


27-year-old  5 now term 3 L3 female who initially presented during the 

early morning of 2018 for induction of labor.  Patient was noted 

be at 39 weeks gestation and had essentially an unremarkable prenatal course.  

She had admitted to an occasional contraction on admission.  Her EDC is 

2018.





Discharge Summary-OBS


Procedures


1.  Epidural per anesthesia


2.  Spontaneous vaginal delivery


3.  Repair of first-degree perineal laceration


Discharge Physical Examination


Allergies:  


Coded Allergies:  


     No Known Drug Allergies (Unverified , 17)


Vitals & I&Os





Vital Signs








  Date Time  Temp Pulse Resp B/P (MAP) Pulse Ox O2 Delivery O2 Flow Rate FiO2


 


18 03:30 97.7 78 20 95/45 (62) 100 Room Air  


 


18 16:34       15.00 








General Appearance:  No Acute Distress


Respiratory:  Clear to Auscultation


Cardiovascular:  Regular Rate


Abdominal:  Soft (With uterus firm)


Extremities:  No Edema


Skin:  No Rashes





Hospital Course


Patient was admitted during the morning of 2018 for induction of 

labor.  Patient presented at 39 weeks gestation with membranes initially 

intact.  Patient underwent artificial rupture of membranes following her 

admission.  She was noted to have a hemoglobin initially of 13.1.  She required 

Pitocin augmentation during the course of her labor to achieve adequate 

contractions and dilation of cervix.  Ultimately she went on to completion and 

delivered over a first-degree perineal laceration a term viable female.  

Delivery was accomplished during the afternoon of 2018 with Apgars 

of 8 at 1 minute and 9 at 5 minutes on infant.  She had received epidural 

during the course of her labor and tolerated well.  See labor and delivery note 

for full details.





Following delivery she underwent routine postpartum care orders.  Patient had 

no complications during the remainder of hospital stay.  Her hemoglobin the day 

after delivery was noted to be 10.8.  Patient was without any dizziness and she 

complained of no shortness of breath or chest pain.  She tolerated regular 

diet.  She was felt ready for dismissal during the morning of 2018 

with all questions answered.





Discharge


Instructions to patient/family


Please see electronic discharge instructions given to patient.


Discharge Medications


Reviewed and agree with Discharge Medication list on patient's Discharge 

Instruction sheet





Clinical Quality Measures


DVT/VTE Risk/Contraindication:


Risk Factor Score Per Nursin


RFS Level Per Nursing on Admit:  2=Moderate











JOEY CULVER MD 2018 07:30

## 2021-10-23 ENCOUNTER — HOSPITAL ENCOUNTER (EMERGENCY)
Dept: HOSPITAL 75 - ER | Age: 31
Discharge: HOME | End: 2021-10-23
Payer: COMMERCIAL

## 2021-10-23 VITALS — DIASTOLIC BLOOD PRESSURE: 58 MMHG | SYSTOLIC BLOOD PRESSURE: 104 MMHG

## 2021-10-23 VITALS — HEIGHT: 64.96 IN | BODY MASS INDEX: 34.16 KG/M2 | WEIGHT: 205.03 LBS

## 2021-10-23 DIAGNOSIS — N93.8: Primary | ICD-10-CM

## 2021-10-23 DIAGNOSIS — Z90.722: ICD-10-CM

## 2021-10-23 DIAGNOSIS — Z32.02: ICD-10-CM

## 2021-10-23 DIAGNOSIS — Z87.42: ICD-10-CM

## 2021-10-23 LAB
ALBUMIN SERPL-MCNC: 4.2 GM/DL (ref 3.2–4.5)
ALP SERPL-CCNC: 61 U/L (ref 40–136)
ALT SERPL-CCNC: 20 U/L (ref 0–55)
APTT BLD: 28 SEC (ref 24–35)
BASOPHILS # BLD AUTO: 0 10^3/UL (ref 0–0.1)
BASOPHILS NFR BLD AUTO: 0 % (ref 0–10)
BILIRUB SERPL-MCNC: 0.3 MG/DL (ref 0.1–1)
BUN/CREAT SERPL: 14
CALCIUM SERPL-MCNC: 10.1 MG/DL (ref 8.5–10.1)
CHLORIDE SERPL-SCNC: 109 MMOL/L (ref 98–107)
CO2 SERPL-SCNC: 22 MMOL/L (ref 21–32)
CREAT SERPL-MCNC: 0.64 MG/DL (ref 0.6–1.3)
EOSINOPHIL # BLD AUTO: 0.1 10^3/UL (ref 0–0.3)
EOSINOPHIL NFR BLD AUTO: 1 % (ref 0–10)
GFR SERPLBLD BASED ON 1.73 SQ M-ARVRAT: 108 ML/MIN
GLUCOSE SERPL-MCNC: 92 MG/DL (ref 70–105)
HCT VFR BLD CALC: 42 % (ref 35–52)
HGB BLD-MCNC: 14.5 G/DL (ref 11.5–16)
INR PPP: 0.9 (ref 0.8–1.4)
LYMPHOCYTES # BLD AUTO: 2.9 10^3/UL (ref 1–4)
LYMPHOCYTES NFR BLD AUTO: 32 % (ref 12–44)
MANUAL DIFFERENTIAL PERFORMED BLD QL: NO
MCH RBC QN AUTO: 32 PG (ref 25–34)
MCHC RBC AUTO-ENTMCNC: 35 G/DL (ref 32–36)
MCV RBC AUTO: 92 FL (ref 80–99)
MONOCYTES # BLD AUTO: 0.7 10^3/UL (ref 0–1)
MONOCYTES NFR BLD AUTO: 8 % (ref 0–12)
NEUTROPHILS # BLD AUTO: 5.2 10^3/UL (ref 1.8–7.8)
NEUTROPHILS NFR BLD AUTO: 58 % (ref 42–75)
PLATELET # BLD: 333 10^3/UL (ref 130–400)
PMV BLD AUTO: 9.7 FL (ref 9–12.2)
POTASSIUM SERPL-SCNC: 3.8 MMOL/L (ref 3.6–5)
PROT SERPL-MCNC: 7.2 GM/DL (ref 6.4–8.2)
PROTHROMBIN TIME: 12.1 SEC (ref 12.2–14.7)
SODIUM SERPL-SCNC: 141 MMOL/L (ref 135–145)
WBC # BLD AUTO: 9 10^3/UL (ref 4.3–11)

## 2021-10-23 PROCEDURE — 85730 THROMBOPLASTIN TIME PARTIAL: CPT

## 2021-10-23 PROCEDURE — 76856 US EXAM PELVIC COMPLETE: CPT

## 2021-10-23 PROCEDURE — 84703 CHORIONIC GONADOTROPIN ASSAY: CPT

## 2021-10-23 PROCEDURE — 85610 PROTHROMBIN TIME: CPT

## 2021-10-23 PROCEDURE — 36415 COLL VENOUS BLD VENIPUNCTURE: CPT

## 2021-10-23 PROCEDURE — 76830 TRANSVAGINAL US NON-OB: CPT

## 2021-10-23 PROCEDURE — 80053 COMPREHEN METABOLIC PANEL: CPT

## 2021-10-23 PROCEDURE — 85025 COMPLETE CBC W/AUTO DIFF WBC: CPT

## 2021-10-23 NOTE — ED GU-FEMALE
General


Chief Complaint:   - Reproductive


Stated Complaint:  VAG BLEEDING/CRAMPING


Nursing Triage Note:  


patient states since last night at 2200 started cramping and bleeding. states XL




tampon and pad, changed hourly, back, vaginal, and abdominal pain. patient 


states hx of 7 D&Cs, 3 live births


Source:  patient


Exam Limitations:  no limitations


 (LIV MONROY MD)





History of Present Illness


Date Seen by Provider:  Oct 23, 2021


Time Seen by Provider:  16:00


Initial Comments


31-year-old young lady presents to the emergency room with complaints of heavy 

vaginal bleeding since .  She has developed escalating lower pelvic pain 

since then.  Pain is sharp and bilateral in the adnexal regions.  She reports 

soaking a tampon and pad about every hour for the last several hours.  She 

reports having prior problems with vaginal bleeding and states she was advised 

to have a hysterectomy with her last child.  She reports Dr. Morrison 

removed a "tumor" from her uterus in .  Review of chart shows that she had 

surgery in  consisting of uterine septum revision with biopsy, partial 

bilateral nephrectomies, and right ovarian cystectomy.  She has not seen Dr. Morrison since her surgery and she receives her primary care and obstetrical

care from Dr. Culver.  Patient has also done some fertility consultation with Dr. VERA.  Patient has history of endometriosis and PCOS and does not necessarily 

have regular cycles.


 (LIV MONROY MD)





Allergies and Home Medications


Allergies


Coded Allergies:  


     No Known Drug Allergies (Unverified , 17)





Patient Home Medication List


Home Medication List Reviewed:  Yes


 (LIV MONROY MD)


Ibuprofen (Ibuprofen) 600 Mg Tablet, 600 MG PO Q6H PRN for CRAMPS


   Prescribed by: JOEY CULVER on 18 0733


Norethindrone Acetate (Aygestin) 5 Mg Tablet, 5 MG PO DAILY


   Prescribed by: GENARO DEL ANGEL on 10/23/21 185


Norethindrone Acetate (Aygestin) 5 Mg Tablet, 5 MG PO DAILY


   Prescribed by: GENARO DEL ANGEL on 10/23/21 192


Pnv No.122/Iron/Folic Acid (Prenatal Multi Tablet) 1 Each Tablet, 1 EACH PO, 

(Reported)


   Entered as Reported by: BENSON SCHAEFFER on 17


Tranexamic Acid (Tranexamic Acid) 650 Mg Tablet, 1,300 MG PO TID


   Prescribed by: GENARO DEL ANGEL on 10/23/21 1925


Tranexamic Acid (Tranexamic Acid) 650 Mg Tablet, 1,300 MG PO TID


   Prescribed by: GENARO DEL ANGEL on 10/23/21 1925





Review of Systems


Review of Systems


Constitutional:  no symptoms reported


EENTM:  no symptoms reported


Respiratory:  no symptoms reported


Cardiovascular:  no symptoms reported


Gastrointestinal:  no symptoms reported


Genitourinary:  see HPI


Pregnant:  No


Musculoskeletal:  no symptoms reported


Skin:  no symptoms reported


Psychiatric/Neurological:  No Symptoms Reported


Endocrine:  No Symptoms Reported


Hematologic/Lymphatic:  No Symptoms Reported (LIV MONROY MD)





Past Medical-Social-Family Hx


Patient Social History


Tobacco Use?:  No


Use of E-Cig and/or Vaping dev:  No


Substance use?:  No


Alcohol Use?:  No


Pt feels they are or have been:  No


 (LIV MONROY MD)





Immunizations Up To Date


Tetanus Booster (TDap):  More than 5yrs


First/Initial COVID19 Vaccinat:  done


Second COVID19 Vaccination Francisco J:  done


 (LIV MONROY MD)





Seasonal Allergies


Seasonal Allergies:  No


 (LIV MONROY MD)





Past Medical History


Surgery/Hospitalization HX:  


PCOS, D&Cx7


Surgeries:  Yes (endometriosis, ovarian cyst removal, d&c, )


Cystectomy (Right ovarian cystectomy), Gallbladder, Oophorectomy (Partial 

bilateral), Tonsillectomy


Respiratory:  No


Cardiac:  No


Neurological:  No


Reproductive Disorders:  Yes (DUB, OVARIAN CYSTS, SEPTUM REMOVED FROM UTERUS; 

MULTIPLE MISCARRIAGES)


Female Reproductive Disorders:  Endometriosis, Ovarian Cyst


Sexually Transmitted Disease:  No


HIV/AIDS:  No


Genitourinary:  No


Gastrointestinal:  Yes (gallbladder removed )


Gall Bladder Disease


Musculoskeletal:  No


Endocrine:  No


HEENT:  No


Tonsilitis


Loss of Vision:  Bilateral


Hearing Impairment:  Denies


Cancer:  No


Psychosocial:  No


Integumentary:  No


Blood Disorders:  No


 (LIV MONROY MD)





Family Medical History





Asthma


  19 MOTHER


  maternal gdma


Cardiovascular disease


  maternal gdma


Completed stroke


  MATERNAL GDPA


Deafness or hearing loss


  19 MOTHER


Diabetes mellitus


  19 FATHER


Myocardial infarction


  maternal gdma


  MATERNAL GDPA


Psychosocial problem


  19 MOTHER


Respiratory disorder


  19 MOTHER


  maternal gdma


Seizure disorder


  19 MOTHER (EPILEPSY)


  G8 BROTHER (EPILEPSY)





No Family History of:


  AIDS


  Abdominal aortic aneurysm


  [place]'s disease


  Alcoholism


  Alzheimer's disease


  Aphasia


  Arthritis


  Cancer of mouth


  Cataracts


  Colon cancer


  Congenital disease


  Congenital heart disease


  Coronary thrombosis


  Cystic fibrosis


  Dementia


  Drug abuse


  Dysphasia


  Fibrocystic disease of breast


  Gastroenteritis


  Glaucoma


  Headache disorder


  Hypercholesterolemia


  Hypertension


  Infertility


  Kidney disease


  Neoplasm


  Not obtainable due to adoption


  Osteoporosis


  Parkinson's disease


  Prostate cancer


  Severe allergy


  Thyroid disease


  Tuberculosis


  Visual disorder





Physical Exam


Vital Signs





Vital Signs - First Documented








 10/23/21





 15:53


 


Temp 36.0


 


Pulse 84


 


Resp 20


 


B/P (MAP) 132/67 (88)


 


Pulse Ox 99


 


O2 Delivery Room Air





 (GENARO DEL ANGEL MD)


Vital Signs


Capillary Refill : Less Than 3 Seconds 


 (LIV MONROY MD)


Height, Weight, BMI


Height: 5'4.00"


Weight: 272lbs. 0.0oz. [id number]; 34.00 BMI


Method:Stated


General Appearance:  WD/WN, moderate distress


HEENT:  normal ENT inspection


Neck:  normal inspection


Cardiovascular:  regular rate, rhythm, no edema, no murmur


Respiratory:  lungs clear, normal breath sounds, no respiratory distress, no 

accessory muscle use


Gastrointestinal:  normal bowel sounds, soft, tenderness (Tenderness throughout 

the lower pelvic region at the bilateral adnexa.  Patient actively having 

vaginal bleeding.)


Extremities:  non-tender, normal inspection, no pedal edema


Neurologic/Psychiatric:  CNs II-XII nml as tested, no motor/sensory deficits, 

alert, normal mood/affect, oriented x 3


Skin:  normal color, warm/dry (LIV MONROY MD)





Progress/Results/Core Measures


Suspected Sepsis


SIRS


Temperature: 


Pulse: 84 


Respiratory Rate: 20


 


Laboratory Tests


10/23/21 15:50: White Blood Count 9.0


Blood Pressure 132 /67 


Mean: 88


 


Laboratory Tests


10/23/21 15:50: 


Creatinine 0.64, INR Comment 0.9, Platelet Count 333, Total Bilirubin 0.3


 (LIV MONROY MD)





Results/Orders


Lab Results





Laboratory Tests








Test


 10/23/21


15:50 Range/Units


 


 


White Blood Count


 9.0 


 4.3-11.0


10^3/uL


 


Red Blood Count


 4.54 


 3.80-5.11


10^6/uL


 


Hemoglobin 14.5  11.5-16.0  g/dL


 


Hematocrit 42  35-52  %


 


Mean Corpuscular Volume 92  80-99  fL


 


Mean Corpuscular Hemoglobin 32  25-34  pg


 


Mean Corpuscular Hemoglobin


Concent 35 


 32-36  g/dL





 


Red Cell Distribution Width 12.2  10.0-14.5  %


 


Platelet Count


 333 


 130-400


10^3/uL


 


Mean Platelet Volume 9.7  9.0-12.2  fL


 


Immature Granulocyte % (Auto) 0   %


 


Neutrophils (%) (Auto) 58  42-75  %


 


Lymphocytes (%) (Auto) 32  12-44  %


 


Monocytes (%) (Auto) 8  0-12  %


 


Eosinophils (%) (Auto) 1  0-10  %


 


Basophils (%) (Auto) 0  0-10  %


 


Neutrophils # (Auto)


 5.2 


 1.8-7.8


10^3/uL


 


Lymphocytes # (Auto)


 2.9 


 1.0-4.0


10^3/uL


 


Monocytes # (Auto)


 0.7 


 0.0-1.0


10^3/uL


 


Eosinophils # (Auto)


 0.1 


 0.0-0.3


10^3/uL


 


Basophils # (Auto)


 0.0 


 0.0-0.1


10^3/uL


 


Immature Granulocyte # (Auto)


 0.0 


 0.0-0.1


10^3/uL


 


Prothrombin Time 12.1 L 12.2-14.7  SEC


 


INR Comment 0.9  0.8-1.4  


 


Activated Partial


Thromboplast Time 28 


 24-35  SEC





 


Sodium Level 141  135-145  MMOL/L


 


Potassium Level 3.8  3.6-5.0  MMOL/L


 


Chloride Level 109 H   MMOL/L


 


Carbon Dioxide Level 22  21-32  MMOL/L


 


Anion Gap 10  5-14  MMOL/L


 


Blood Urea Nitrogen 9  7-18  MG/DL


 


Creatinine


 0.64 


 0.60-1.30


MG/DL


 


Estimat Glomerular Filtration


Rate 108 


  





 


BUN/Creatinine Ratio 14   


 


Glucose Level 92    MG/DL


 


Calcium Level 10.1  8.5-10.1  MG/DL


 


Corrected Calcium 9.9  8.5-10.1  MG/DL


 


Total Bilirubin 0.3  0.1-1.0  MG/DL


 


Aspartate Amino Transf


(AST/SGOT) 17 


 5-34  U/L





 


Alanine Aminotransferase


(ALT/SGPT) 20 


 0-55  U/L





 


Alkaline Phosphatase 61    U/L


 


Total Protein 7.2  6.4-8.2  GM/DL


 


Albumin 4.2  3.2-4.5  GM/DL


 


Serum Pregnancy Test,


Qualitative NEGATIVE 


 NEGATIVE  








 (GENARO DEL ANGEL MD)


My Orders





Orders - GENARO DEL ANGEL MD


Ketorolac Injection (Toradol Injection) (10/23/21 18:45)


Tranexamic Acid Injection (Cyklokapron I (10/23/21 18:45)


Ondansetron Injection (Zofran Injectio (10/23/21 19:00)


 (GENARO DEL ANGEL MD)


Medications Given in ED





Current Medications








 Medications  Dose


 Ordered  Sig/Agnieszka


 Route  Start Time


 Stop Time Status Last Admin


Dose Admin


 


 Fentanyl Citrate  75 mcg  ONCE  ONCE


 IVP  10/23/21 16:15


 10/23/21 16:16 DC 10/23/21 16:23


75 MCG


 


 Ketorolac


 Tromethamine  30 mg  ONCE  ONCE


 IVP  10/23/21 18:45


 10/23/21 18:46 DC 10/23/21 18:44


30 MG


 


 Ondansetron HCl  8 mg  ONCE  ONCE


 IVP  10/23/21 19:00


 10/23/21 19:01 DC 10/23/21 19:06


8 MG


 


 Tranexamic Acid


 1000 mg/Sodium


 Chloride  60 ml @ 


 330 mls/hr  ONCE  ONCE


 IV  10/23/21 18:45


 10/23/21 18:55 DC 10/23/21 18:45


330 MLS/HR





 (GENARO DEL ANGEL MD)


Vital Signs/I&O











 10/23/21





 15:53


 


Temp 36.0


 


Pulse 84


 


Resp 20


 


B/P (MAP) 132/67 (88)


 


Pulse Ox 99


 


O2 Delivery Room Air





 (GENARO DEL ANGEL MD)


Vital Signs/I&O


Capillary Refill : Less Than 3 Seconds 


 (LIV MONROY MD)








Blood Pressure Mean:                    88








Progress Note :  


   Time:  18:18


Progress Note


Patient has received doses of fentanyl 75 mcg and morphine 4 mg by IV route to 

control her pain.  She was taken for ultrasound.  There were no gross abno

rmalities noted.  She had a lot of heterogeneous material inside the uterus.  

There was a notable amount of bleeding during ultrasound and some passage of 

clots.


 (LIV MONROY MD)


Progress Note :  


   Time:  18:47


Progress Note


Dr. Rubio called back and spoke with Dr Alcala.  Her recommendations included

IV Toradol, TXA 1 g IV x1.   mg 2 tablets 3 times daily for 5 days and to

start Aygestin 5 mg daily until follow-up or told to stop by GYN.


Patient reexamined by me still has moderate degree of pelvic discomfort.  Vital 

signs are stable.  Labs have been reviewed.  Patient denies any fever, nausea, 

upper abdominal pain.


I have communicated the plan with her and she is agreeable.  All questions are 

sought and answered.


 (GENARO DEL ANGEL MD)





Diagnostic Imaging





Comments


                 [facility].


                                [place], [place]





NAME:   PATRICA JORDAN


[facility] REC#:   [id number]


ACCOUNT#:   [id number]


PT STATUS:   REG ER


:   1990


PHYSICIAN:   LIV MONROY MD


ADMIT DATE:   10/23/21/ER


                                   ***Draft***


Date of Exam:10/23/21





US NON OB PELVIS COMP/TRANSVAG








PROCEDURE: US non-OB pelvis comp/trans.





TECHNIQUE:  Multiple realtime grayscale images were obtained of


the pelvis in various projections, endovaginally. Transabdominal


imaging was also performed.





INDICATION: Vaginal bleeding and pelvic cramping.





FINDINGS: The uterus is anteverted. The uterus measures 9.4 x 5.2


x 5.5 cm. There is mild heterogeneity of the uterine myometrium


but no finding of a definable mass. The endometrial bilayer


thickness measured 1 cm. There is no fluid within the endometrial


canal.





The right ovary is measured at 2.6 x 2.1 x 3.0 cm. The left ovary


measures 2.5 x 1.5 x 1.7 cm. Both ovaries demonstrate appropriate


Doppler flow. There is no finding of an adnexal mass.





There is trace free fluid evident within the pelvic cul-de-sac.





IMPRESSION:


1. Heterogeneous appearance of the myometrium of the uterus


without focal abnormality.


2. Normal endometrial thickness without endometrial fluid.


3. Both ovaries demonstrate Doppler flow without adnexal mass or


torsion.


4. Trace free fluid within the cul-de-sac.





  Dictated on workstation # [id number]








Dict:   10/23/21 1850


Trans:   10/23/21 1904


[facility] [phone]





Interpreted by:     SHIRA BOONE MD


Electronically signed by:  


 (GENARO DEL ANGEL MD)





Departure


Communication (Admissions)


Time/Spoke to Consulting Phy:  18:40


Discussed with Dr Jernigan by Dr Alcala


 (GENARO DEL ANGEL MD)





Impression





   Primary Impression:  


   Dysfunctional uterine bleeding


   Additional Impression:  


   Pelvic pain


Disposition:   HOME, SELF-CARE


Condition:  Stable





Departure-Patient Inst.


Decision time for Depature:  18:49


 (GENARO DEL ANGEL MD)


Referrals:  


JOEY CULVER MD (PCP/Family)


Primary Care Physician








VAMSHI JERNIGAN DO


Patient Instructions:  IRREGULAR VAGINAL BLEEDING





Add. Discharge Instructions:  


TXA 650mg tablets 2 pills 3 times a day for 5 day.


Following the 5th day start Aygestin 5mg once a day until you are instructed to 

stop by Dr Jernigan.





Please follow up with a phone call to Dr Jernigan's office on Monday to get an 

appointment for follow up.





Come back to the Emergency Department for any new concerns, fever, worse pain or

other concerning symptoms.


Scripts


Norethindrone Acetate (Aygestin) 5 Mg Tablet


5 MG PO DAILY, #30 TAB


   Prov: GENARO DEL ANGEL MD         10/23/21 


Tranexamic Acid (Tranexamic Acid) 650 Mg Tablet


1300 MG PO TID for 5 Days, #30 TAB


   Prov: GENARO DEL ANGEL MD         10/23/21 


Tranexamic Acid (Tranexamic Acid) 650 Mg Tablet


1300 MG PO TID for 5 Days, #30 TAB


   start the day after you finish the Tranexamic axid


   Prov: GENARO DEL ANGEL MD         10/23/21 


Norethindrone Acetate (Aygestin) 5 Mg Tablet


5 MG PO DAILY, #30 TAB


   Prov: GENARO DEL ANGEL MD         10/23/21





Copy


Copies To 1:   VAMSHI JERNIGAN DO


Copies To 2:   JOEY CULVER MD, JOSHUA T MD        Oct 23, 2021 17:33


GENARO DEL ANGEL MD         Oct 23, 2021 18:42

## 2021-10-23 NOTE — DIAGNOSTIC IMAGING REPORT
PROCEDURE: US non-OB pelvis comp/trans.



TECHNIQUE:  Multiple realtime grayscale images were obtained of

the pelvis in various projections, endovaginally. Transabdominal

imaging was also performed.



INDICATION: Vaginal bleeding and pelvic cramping.



FINDINGS: The uterus is anteverted. The uterus measures 9.4 x 5.2

x 5.5 cm. There is mild heterogeneity of the uterine myometrium

but no finding of a definable mass. The endometrial bilayer

thickness measured 1 cm. There is no fluid within the endometrial

canal.



The right ovary is measured at 2.6 x 2.1 x 3.0 cm. The left ovary

measures 2.5 x 1.5 x 1.7 cm. Both ovaries demonstrate appropriate

Doppler flow. There is no finding of an adnexal mass.



There is trace free fluid evident within the pelvic cul-de-sac.



IMPRESSION:

1. Heterogeneous appearance of the myometrium of the uterus

without focal abnormality.

2. Normal endometrial thickness without endometrial fluid.

3. Both ovaries demonstrate Doppler flow without adnexal mass or

torsion.

4. Trace free fluid within the cul-de-sac.



Dictated by: 



  Dictated on workstation # [id number]

## 2021-11-11 ENCOUNTER — HOSPITAL ENCOUNTER (OUTPATIENT)
Dept: HOSPITAL 75 - PREOP | Age: 31
Discharge: HOME | End: 2021-11-11
Attending: OBSTETRICS & GYNECOLOGY
Payer: COMMERCIAL

## 2021-11-11 VITALS — HEIGHT: 65 IN | BODY MASS INDEX: 36.07 KG/M2 | WEIGHT: 216.49 LBS

## 2021-11-11 DIAGNOSIS — Z01.818: Primary | ICD-10-CM

## 2021-11-12 ENCOUNTER — HOSPITAL ENCOUNTER (OUTPATIENT)
Dept: HOSPITAL 75 - SDC | Age: 31
Discharge: HOME | End: 2021-11-12
Attending: OBSTETRICS & GYNECOLOGY
Payer: COMMERCIAL

## 2021-11-12 VITALS — BODY MASS INDEX: 36.07 KG/M2 | WEIGHT: 216.49 LBS | HEIGHT: 65 IN

## 2021-11-12 VITALS — SYSTOLIC BLOOD PRESSURE: 111 MMHG | DIASTOLIC BLOOD PRESSURE: 66 MMHG

## 2021-11-12 VITALS — SYSTOLIC BLOOD PRESSURE: 118 MMHG | DIASTOLIC BLOOD PRESSURE: 81 MMHG

## 2021-11-12 VITALS — DIASTOLIC BLOOD PRESSURE: 63 MMHG | SYSTOLIC BLOOD PRESSURE: 104 MMHG

## 2021-11-12 VITALS — SYSTOLIC BLOOD PRESSURE: 108 MMHG | DIASTOLIC BLOOD PRESSURE: 71 MMHG

## 2021-11-12 VITALS — DIASTOLIC BLOOD PRESSURE: 73 MMHG | SYSTOLIC BLOOD PRESSURE: 108 MMHG

## 2021-11-12 VITALS — SYSTOLIC BLOOD PRESSURE: 119 MMHG | DIASTOLIC BLOOD PRESSURE: 86 MMHG

## 2021-11-12 VITALS — SYSTOLIC BLOOD PRESSURE: 113 MMHG | DIASTOLIC BLOOD PRESSURE: 83 MMHG

## 2021-11-12 VITALS — SYSTOLIC BLOOD PRESSURE: 103 MMHG | DIASTOLIC BLOOD PRESSURE: 60 MMHG

## 2021-11-12 VITALS — SYSTOLIC BLOOD PRESSURE: 112 MMHG | DIASTOLIC BLOOD PRESSURE: 86 MMHG

## 2021-11-12 VITALS — SYSTOLIC BLOOD PRESSURE: 117 MMHG | DIASTOLIC BLOOD PRESSURE: 82 MMHG

## 2021-11-12 VITALS — DIASTOLIC BLOOD PRESSURE: 66 MMHG | SYSTOLIC BLOOD PRESSURE: 111 MMHG

## 2021-11-12 DIAGNOSIS — F32.A: ICD-10-CM

## 2021-11-12 DIAGNOSIS — Z82.49: ICD-10-CM

## 2021-11-12 DIAGNOSIS — I10: ICD-10-CM

## 2021-11-12 DIAGNOSIS — Z93.9: ICD-10-CM

## 2021-11-12 DIAGNOSIS — Z83.71: ICD-10-CM

## 2021-11-12 DIAGNOSIS — Z90.49: ICD-10-CM

## 2021-11-12 DIAGNOSIS — N84.0: Primary | ICD-10-CM

## 2021-11-12 DIAGNOSIS — E66.9: ICD-10-CM

## 2021-11-12 DIAGNOSIS — Z83.3: ICD-10-CM

## 2021-11-12 DIAGNOSIS — Z90.89: ICD-10-CM

## 2021-11-12 DIAGNOSIS — Z79.899: ICD-10-CM

## 2021-11-12 PROCEDURE — 88305 TISSUE EXAM BY PATHOLOGIST: CPT

## 2021-11-12 PROCEDURE — 87081 CULTURE SCREEN ONLY: CPT

## 2021-11-12 PROCEDURE — 84703 CHORIONIC GONADOTROPIN ASSAY: CPT

## 2021-11-12 RX ADMIN — SODIUM CHLORIDE, SODIUM LACTATE, POTASSIUM CHLORIDE, AND CALCIUM CHLORIDE PRN MLS/HR: 600; 310; 30; 20 INJECTION, SOLUTION INTRAVENOUS at 13:38

## 2021-11-12 RX ADMIN — SODIUM CHLORIDE, SODIUM LACTATE, POTASSIUM CHLORIDE, AND CALCIUM CHLORIDE PRN MLS/HR: 600; 310; 30; 20 INJECTION, SOLUTION INTRAVENOUS at 12:42

## 2021-11-12 NOTE — PROGRESS NOTE-PRE OPERATIVE
Pre-Operative Progress Note


H&P Reviewed


The H&P was reviewed, patient examined and no changes noted.


Date Seen by Provider:  Nov 12, 2021


Time Seen by Provider:  12:00


Date H&P Reviewed:  Nov 12, 2021


Time H&P Reviewed:  12:00


Pre-Operative Diagnosis:  abnormal uterine  bleeding











VAMSHI JERNIGAN DO               Nov 12, 2021 12:39

## 2021-11-12 NOTE — ANESTHESIA-GENERAL POST-OP
General


Patient Condition


Mental Status/LOC:  Same as Preop


Cardiovascular:  Satisfactory


Nausea/Vomiting:  Absent


Respiratory:  Satisfactory


Pain:  Controlled


Complications:  Absent





Post Op Complications


Complications


None





Follow Up Care/Instructions


Patient Instructions


None needed.





Anesthesia/Patient Condition


Patient Condition


Patient is doing well, no complaints, stable vital signs, no apparent adverse 

anesthesia problems.   


No complications reported per nursing.


D/C home per [facility] Criteria:  Yes











NITHIN NOVAK CRNA           Nov 12, 2021 15:06

## 2021-11-12 NOTE — OPERATIVE REPORT
Operative Report


Date of Procedure/Surgery


Nov 12, 2021


Surgeon (s)


VAMSHI JERNIGAN DO


Assistant (s):  NA





Post-Operative Diagnosis





endometrial polyps





Procedure Performed





Hysteroscopy, dilation and curettage





Description of Procedure


Anesthesia Type:  General


Estimated blood loss (mL):  minimal


Specimen(s) collected/removed


endometrial curettings


Description of the Procedure


With informed consent the patient was taken to the operating room where general 

anesthesia was found to be adequate.  She was then prepped and draped in the 

usual sterile fashion in the dorsolithotomy position.





The bladder was then drained of clear, yellow urine. A speculum was placed in 

the vagina and the cervix was grasped with a tenaculum.  The cervix was then 

gently dilated with Wesley dilators.  I then inserted the hysteroscope and the 

mentioned findings were seen.  I removed the scope and did a gent curette until 

a gritty texture was heard.  





I repeated the scope and revealed the polypoid tissue was removed.  





At this point, I removed the instruments from the vagina and the cervix.  Any 

bleeding from the tenaculum site was controlled with pressure. 





The patient was now awakened and taken to recovery in stable condition.





Sponge, lap, needle and instrument counts were correct times two.


Findings of the Procedure


proliferative endometrium with polypoid features





Allergies and Home Medications


Allergies


Coded Allergies:  


     No Known Drug Allergies (Unverified , 11/11/17)





Patient Home Medication List


Home Medication List Reviewed:  Yes


Bupropion HCl (Wellbutrin Xl) 300 Mg Tab.er.24h, 300 MG PO DAILY, (Reported)


   Entered as Reported by: ORION DEL ROSARIO on 11/11/21 0909


Multivitamin (Multivitamin) 1 Each Tablet, 1 EACH PO DAILY, (Reported)


   Entered as Reported by: ORION DEL ROSARIO on 11/11/21 0909


Norethindrone Acetate (Aygestin) 5 Mg Tablet, 5 MG PO DAILY, (Reported)


   Entered as Reported by: ORION DEL ROSARIO on 11/11/21 0909


Discontinued Medications


Ibuprofen (Ibuprofen) 600 Mg Tablet, 600 MG PO Q6H PRN for CRAMPS


   Discontinued Reason: No Longer Taking


   Prescribed by: JOEY CULVER on 2/23/18 0733


Norethindrone Acetate (Aygestin) 5 Mg Tablet, 5 MG PO DAILY


   Discontinued Reason: New Order


   Prescribed by: GENARO DEL ANGEL on 10/23/21 1852


Norethindrone Acetate (Aygestin) 5 Mg Tablet, 5 MG PO DAILY


   Discontinued Reason: No Longer Taking


   Prescribed by: GENARO DEL ANGEL on 10/23/21 1925


Pnv No.122/Iron/Folic Acid (Prenatal Multi Tablet) 1 Each Tablet, 1 EACH PO, 

(Reported)


   Discontinued Reason: No Longer Taking


   Entered as Reported by: BENSON SCHAEFFER on 9/12/17 1933


Tranexamic Acid (Tranexamic Acid) 650 Mg Tablet, 1,300 MG PO TID


   Discontinued Reason: No Longer Taking


   Prescribed by: GENARO DEL ANGEL on 10/23/21 1925


Tranexamic Acid (Tranexamic Acid) 650 Mg Tablet, 1,300 MG PO TID


   Discontinued Reason: No Longer Taking


   Prescribed by: GENARO DEL ANGEL on 10/23/21 1925











VAMSHI JERNIGAN DO               Nov 12, 2021 13:19

## 2021-11-12 NOTE — DISCHARGE INST-WOMEN'S SERVICE
Discharge Inst-Women's Serv


Depart Medication/Instructions


New, Converted or Re-Newed RX:  Transmitted to Pharmacy


Instructions


continue the norethindrone for 2 more weeks and then DC


Final Diagnosis


abnormal uterine bleeding


uterine/endometrial polyp


Problems Reviewed?:  Yes





Consults/Follow Up


Additional Follow Up:  Yes (1-2 weeks for pathology review)





Activity


Activity:  Activity as Tolerated


Driving Instructions:  No Driving for 24 Hours


NO SMOKING:  NO SMOKING





Diet


Discharge Diet:  No Restrictions


Symptoms to Report to :  Bleeding Excessive, Pain Increased, Fever Over 101 

Degrees F, Vaginal Bleeding Increase, Vaginal Discharge Foul


For Any Problems or Questions:  Contact Your Physician











VAMSHI JERNIGAN DO               Nov 12, 2021 13:24

## 2021-12-05 ENCOUNTER — HOSPITAL ENCOUNTER (EMERGENCY)
Dept: HOSPITAL 75 - ER | Age: 31
Discharge: HOME | End: 2021-12-05
Payer: COMMERCIAL

## 2021-12-05 VITALS — WEIGHT: 216.93 LBS | BODY MASS INDEX: 36.14 KG/M2 | HEIGHT: 64.96 IN

## 2021-12-05 VITALS — SYSTOLIC BLOOD PRESSURE: 130 MMHG | DIASTOLIC BLOOD PRESSURE: 91 MMHG

## 2021-12-05 DIAGNOSIS — J20.9: Primary | ICD-10-CM

## 2021-12-05 DIAGNOSIS — Z20.822: ICD-10-CM

## 2021-12-05 DIAGNOSIS — E66.9: ICD-10-CM

## 2021-12-05 PROCEDURE — 87636 SARSCOV2 & INF A&B AMP PRB: CPT

## 2021-12-05 PROCEDURE — 99283 EMERGENCY DEPT VISIT LOW MDM: CPT

## 2021-12-05 NOTE — ED GENERAL
General


Chief Complaint:  COVID19 Suspect/Confirmed


Stated Complaint:  COVID EXPOSED, COUGH, FEVER, SORE THROAT, HEADACHE


Nursing Triage Note:  


reports covid exposure 11/29/21 c/o cough, sore throat, stuffy nose, syncopal 


episode


Source of Information:  Patient


Exam Limitations:  No Limitations





History of Present Illness


Date Seen by Provider:  Dec 5, 2021


Time Seen by Provider:  06:09


Initial Comments


This 31-year-old woman presents to the emergency room with 5 days of cough, sore

throat, and congestion.  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  She has not had any fever, vomiting, or diarrhea.  

Apparently she coughed so hard recently that she had a brief syncopal episode.  

She denies any other significant medical problems aside from infertility and 

depression.  She is afebrile at this time.  She was placed on Omnicef by the 

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  She does not take any inhalers and is 

not on any steroids.  Patient is triple vaccinated for COVID-19.





Allergies and Home Medications


Allergies


Coded Allergies:  


     No Known Drug Allergies (Unverified , 11/11/17)





Patient Home Medication List


Home Medication List Reviewed:  Yes


Acetaminophen (Acetaminophen) 500 Mg Tablet, 1,000 MG PO Q8H


   Prescribed by: VAMSHI JERNIGAN on 11/12/21 1322


Bupropion HCl (Wellbutrin Xl) 300 Mg Tab.er.24h, 300 MG PO DAILY, (Reported)


   Entered as Reported by: ORION DEL ROSARIO on 11/11/21 0909


Ibuprofen (Ibuprofen) 600 Mg Tablet, 600 MG PO Q6H


   Prescribed by: VAMSHI JERNIGAN on 11/12/21 1322


Multivitamin (Multivitamin) 1 Each Tablet, 1 EACH PO DAILY, (Reported)


   Entered as Reported by: ORION DEL ROSARIO on 11/11/21 0909


Norethindrone Acetate (Aygestin) 5 Mg Tablet, 5 MG PO DAILY, (Reported)


   Entered as Reported by: ORION DEL ROSARIO on 11/11/21 0909


Oxycodone Hcl (Oxyir Tablet) 5 Mg Tab, 5 MG PO Q6H


   Prescribed by: VAMSHI JERNIGAN on 11/12/21 1323





Review of Systems


Review of Systems


Constitutional:  no symptoms reported


EENTM:  see HPI


Respiratory:  see HPI


Cardiovascular:  see HPI


Gastrointestinal:  no symptoms reported


Genitourinary:  no symptoms reported


Musculoskeletal:  no symptoms reported


Skin:  no symptoms reported


Psychiatric/Neurological:  No Symptoms Reported


Hematologic/Lymphatic:  No Symptoms Reported


Immunological/Allergic:  no symptoms reported





Past Medical-Social-Family Hx


Patient Social History


Tobacco Use?:  No


Substance use?:  No


Alcohol Use?:  No


Pt feels they are or have been:  No





Immunizations Up To Date


Tetanus Booster (TDap):  More than 5yrs


First/Initial COVID19 Vaccinat:  2/21


Second COVID19 Vaccination Francisco J:  3/21


Third COVID19 Vaccination Date:  done


COVID19 Vaccine :  [employer]





Seasonal Allergies


Seasonal Allergies:  No





Past Medical History


Surgery/Hospitalization HX:  


PCOS, D&Cx8, t/a, cholecystectomy


Surgeries:  Yes (endometriosis, ovarian cyst removal, d&c x7, uterine 

septumectomy)


Adenoidectomy, Cystectomy, Gallbladder, Oophorectomy, Tonsillectomy


Respiratory:  No


Currently Using CPAP:  No


Currently Using BIPAP:  No


Cardiac:  No


Neurological:  No


Reproductive Disorders:  Yes (DUB, OVARIAN CYSTS, SEPTUM REMOVED FROM UTERUS; 

MULTIPLE MISCARRIAGES)


Female Reproductive Disorders:  Endometriosis, Ovarian Cyst


Sexually Transmitted Disease:  No


HIV/AIDS:  No


Genitourinary:  No


Gastrointestinal:  No


Gall Bladder Disease


Musculoskeletal:  No


Endocrine:  No


HEENT:  No


Tonsilitis


Loss of Vision:  Bilateral


Hearing Impairment:  Denies


Cancer:  No


Psychosocial:  No


Integumentary:  No


Blood Disorders:  No





Family Medical History





Asthma


  19 MOTHER


  maternal gdma


Cardiovascular disease


  maternal gdma


Completed stroke


  MATERNAL GDPA


Deafness or hearing loss


  19 MOTHER


Diabetes mellitus


  19 FATHER


Myocardial infarction


  maternal gdma


  MATERNAL GDPA


Psychosocial problem


  19 MOTHER


Respiratory disorder


  19 MOTHER


  maternal gdma


Seizure disorder


  19 MOTHER (EPILEPSY)


  G8 BROTHER (EPILEPSY)





No Family History of:


  AIDS


  Abdominal aortic aneurysm


  Rahul's disease


  Alcoholism


  Alzheimer's disease


  Aphasia


  Arthritis


  Cancer of mouth


  Cataracts


  Colon cancer


  Congenital disease


  Congenital heart disease


  Coronary thrombosis


  Cystic fibrosis


  Dementia


  Drug abuse


  Dysphasia


  Fibrocystic disease of breast


  Gastroenteritis


  Glaucoma


  Headache disorder


  Hypercholesterolemia


  Hypertension


  Infertility


  Kidney disease


  Neoplasm


  Not obtainable due to adoption


  Osteoporosis


  Parkinson's disease


  Prostate cancer


  Severe allergy


  Thyroid disease


  Tuberculosis


  Visual disorder





Physical Exam


Vital Signs





Vital Signs - First Documented








 12/5/21





 05:57


 


Temp 36.9


 


Pulse 95


 


Resp 18


 


B/P (MAP) 130/91 (104)


 


Pulse Ox 99


 


O2 Delivery Room Air





Capillary Refill : Less Than 3 Seconds


Height, Weight, BMI


Height: 5'4.00"


Weight: 272lbs. 0.0oz. 123.084763tq; 36.00 BMI


Method:Stated


General Appearance:  No Apparent Distress, WD/WN, Obese


HEENT:  PERRL/EOMI, TMs Normal, Normal ENT Inspection, Pharynx Normal


Neck:  Normal Inspection, Non Tender, Supple


Respiratory:  Lungs Clear, Normal Breath Sounds, No Accessory Muscle Use, Other 

(Forced expiration is prolonged and interrupted with cough and wheezing)


Cardiovascular:  Regular Rate, Rhythm, No Edema, No Murmur


Gastrointestinal:  Non Tender, Soft


Extremity:  Normal Inspection, No Pedal Edema


Neurologic/Psychiatric:  Alert, Oriented x3, No Motor/Sensory Deficits, Normal 

Mood/Affect, CNs II-XII Norm as Tested


Skin:  Normal Color, Warm/Dry





Progress/Results/Core Measures


Suspected Sepsis


SIRS


Temperature: 


Pulse: 95 


Respiratory Rate: 18


 


Blood Pressure 130 /91 


Mean: 104





Results/Orders


Lab Results





Laboratory Tests








Test


 12/5/21


06:05 Range/Units


 


 


Influenza Type A (RT-PCR) Not Detected  Not Detecte  


 


Influenza Type B (RT-PCR) Not Detected  Not Detecte  


 


SARS-CoV-2 RNA (RT-PCR) Not Detected  Not Detecte  








My Orders





Orders - LIV MONROY MD


Albuterol Inhaler (Albuterol) (12/5/21 10:00)


Albuterol Inhaler (Albuterol) (12/5/21 06:47)





Vital Signs/I&O











 12/5/21





 05:57


 


Temp 36.9


 


Pulse 95


 


Resp 18


 


B/P (MAP) 130/91 (104)


 


Pulse Ox 99


 


O2 Delivery Room Air





Capillary Refill : Less Than 3 Seconds








Blood Pressure Mean:                    104








Progress Note :  


Progress Note


Covid and influenza tests were negative.  Patient was dispensed with an inhaler.





Departure


Impression





   Primary Impression:  


   Acute bronchitis


   Qualified Codes:  J20.9 - Acute bronchitis, unspecified


Disposition:  01 HOME, SELF-CARE


Condition:  Stable





Departure-Patient Inst.


Decision time for Depature:  06:48


Referrals:  


JOEY CULVER MD (PCP/Family)


Primary Care Physician


Patient Instructions:  Acute Bronchitis





Add. Discharge Instructions:  


You likely have acute bronchitis.  To help control your symptoms, use your 

albuterol inhaler up to 4 puffs in a 4-hour period of time.


Drink plenty of clear liquids to stay well-hydrated.


Complete your antibiotics as previously prescribed.


If the albuterol inhaler alone is not working well enough to control your cough 

and wheezing, consider a steroid injection or starting a short course of oral 

steroids in a day or 2.


You are encouraged to wear a mask when around others until your cough is 

resolved to prevent transmitting viral pathogens.


You may use Tylenol and/or ibuprofen for pain or fever.


Call with questions or concerns.


Return to care if you have worsening symptoms.





All discharge instructions reviewed with patient and/or family. Voiced 

understanding.











LIV MONROY MD         Dec 5, 2021 06:50

## 2022-04-08 ENCOUNTER — HOSPITAL ENCOUNTER (OUTPATIENT)
Dept: HOSPITAL 75 - RAD | Age: 32
End: 2022-04-08
Attending: FAMILY MEDICINE
Payer: COMMERCIAL

## 2022-04-08 DIAGNOSIS — N83.201: ICD-10-CM

## 2022-04-08 DIAGNOSIS — N83.202: Primary | ICD-10-CM

## 2022-04-08 PROCEDURE — 76856 US EXAM PELVIC COMPLETE: CPT

## 2022-04-08 PROCEDURE — 76830 TRANSVAGINAL US NON-OB: CPT

## 2022-04-08 NOTE — DIAGNOSTIC IMAGING REPORT
PROCEDURE: Pelvic comp/transvaginal sonogram.



TECHNIQUE: Complete transabdominal and transvaginal pelvic

ultrasound was performed. In addition, limited pelvic Doppler was

performed.



INDICATION: Left adnexal cyst on recent CT. Study is performed

for further evaluation.



COMPARISON: Correlation is made with CT study from one day

earlier.



FINDINGS: Uterus measures 8.4 x 5.3 x 6.6 cm. Endometrium is 11

mm in thickness. No myometrial mass is detected. Right ovary

measures 3.6 x 2.3 x 2.8 cm and the left ovary measures 4.9 x 3.3

x 4.5 cm. Right ovary does contain a 17 mm cyst. Left ovary

contains a 3.9 cm cyst, likely accounting for the CT abnormality.

There is blood flow to the ovaries. There are multiple cervical

nabothian cysts.



IMPRESSION: Bilateral ovarian cysts, largest on the left, likely

accounting for the CT abnormality.



Dictated by: 



  Dictated on workstation # [id number]

## 2022-05-09 ENCOUNTER — HOSPITAL ENCOUNTER (OUTPATIENT)
Dept: HOSPITAL 75 - PREOP | Age: 32
Discharge: HOME | End: 2022-05-09
Attending: OBSTETRICS & GYNECOLOGY
Payer: COMMERCIAL

## 2022-05-09 VITALS — HEIGHT: 65 IN | BODY MASS INDEX: 38.38 KG/M2 | WEIGHT: 230.38 LBS

## 2022-05-16 ENCOUNTER — HOSPITAL ENCOUNTER (OUTPATIENT)
Dept: HOSPITAL 75 - SDC | Age: 32
LOS: 1 days | Discharge: HOME | End: 2022-05-17
Attending: OBSTETRICS & GYNECOLOGY
Payer: COMMERCIAL

## 2022-05-16 VITALS — DIASTOLIC BLOOD PRESSURE: 56 MMHG | SYSTOLIC BLOOD PRESSURE: 116 MMHG

## 2022-05-16 VITALS — DIASTOLIC BLOOD PRESSURE: 58 MMHG | SYSTOLIC BLOOD PRESSURE: 92 MMHG

## 2022-05-16 VITALS — SYSTOLIC BLOOD PRESSURE: 91 MMHG | DIASTOLIC BLOOD PRESSURE: 48 MMHG

## 2022-05-16 VITALS — DIASTOLIC BLOOD PRESSURE: 58 MMHG | SYSTOLIC BLOOD PRESSURE: 90 MMHG

## 2022-05-16 VITALS — WEIGHT: 230.38 LBS | HEIGHT: 65 IN | BODY MASS INDEX: 38.38 KG/M2

## 2022-05-16 VITALS — SYSTOLIC BLOOD PRESSURE: 87 MMHG | DIASTOLIC BLOOD PRESSURE: 53 MMHG

## 2022-05-16 VITALS — SYSTOLIC BLOOD PRESSURE: 91 MMHG | DIASTOLIC BLOOD PRESSURE: 55 MMHG

## 2022-05-16 VITALS — SYSTOLIC BLOOD PRESSURE: 85 MMHG | DIASTOLIC BLOOD PRESSURE: 45 MMHG

## 2022-05-16 VITALS — SYSTOLIC BLOOD PRESSURE: 110 MMHG | DIASTOLIC BLOOD PRESSURE: 56 MMHG

## 2022-05-16 VITALS — DIASTOLIC BLOOD PRESSURE: 55 MMHG | SYSTOLIC BLOOD PRESSURE: 92 MMHG

## 2022-05-16 VITALS — SYSTOLIC BLOOD PRESSURE: 95 MMHG | DIASTOLIC BLOOD PRESSURE: 54 MMHG

## 2022-05-16 VITALS — DIASTOLIC BLOOD PRESSURE: 53 MMHG | SYSTOLIC BLOOD PRESSURE: 91 MMHG

## 2022-05-16 VITALS — SYSTOLIC BLOOD PRESSURE: 111 MMHG | DIASTOLIC BLOOD PRESSURE: 66 MMHG

## 2022-05-16 DIAGNOSIS — N93.9: ICD-10-CM

## 2022-05-16 DIAGNOSIS — N72: Primary | ICD-10-CM

## 2022-05-16 DIAGNOSIS — Z80.49: ICD-10-CM

## 2022-05-16 DIAGNOSIS — N92.0: ICD-10-CM

## 2022-05-16 DIAGNOSIS — E28.2: ICD-10-CM

## 2022-05-16 DIAGNOSIS — E66.9: ICD-10-CM

## 2022-05-16 LAB
BASOPHILS # BLD AUTO: 0 10^3/UL (ref 0–0.1)
BASOPHILS NFR BLD AUTO: 0 % (ref 0–10)
EOSINOPHIL # BLD AUTO: 0.1 10^3/UL (ref 0–0.3)
EOSINOPHIL NFR BLD AUTO: 1 % (ref 0–10)
HCT VFR BLD CALC: 40 % (ref 35–52)
HGB BLD-MCNC: 14 G/DL (ref 11.5–16)
INR PPP: 0.9 (ref 0.8–1.4)
LYMPHOCYTES # BLD AUTO: 2.7 10^3/UL (ref 1–4)
LYMPHOCYTES NFR BLD AUTO: 31 % (ref 12–44)
MANUAL DIFFERENTIAL PERFORMED BLD QL: NO
MCH RBC QN AUTO: 32 PG (ref 25–34)
MCHC RBC AUTO-ENTMCNC: 35 G/DL (ref 32–36)
MCV RBC AUTO: 92 FL (ref 80–99)
MONOCYTES # BLD AUTO: 0.7 10^3/UL (ref 0–1)
MONOCYTES NFR BLD AUTO: 8 % (ref 0–12)
NEUTROPHILS # BLD AUTO: 5 10^3/UL (ref 1.8–7.8)
NEUTROPHILS NFR BLD AUTO: 58 % (ref 42–75)
PLATELET # BLD: 292 10^3/UL (ref 130–400)
PMV BLD AUTO: 9.4 FL (ref 9–12.2)
PROTHROMBIN TIME: 12.8 SEC (ref 12.2–14.7)
WBC # BLD AUTO: 8.5 10^3/UL (ref 4.3–11)

## 2022-05-16 PROCEDURE — 86901 BLOOD TYPING SEROLOGIC RH(D): CPT

## 2022-05-16 PROCEDURE — 87081 CULTURE SCREEN ONLY: CPT

## 2022-05-16 PROCEDURE — 84703 CHORIONIC GONADOTROPIN ASSAY: CPT

## 2022-05-16 PROCEDURE — 85610 PROTHROMBIN TIME: CPT

## 2022-05-16 PROCEDURE — 86850 RBC ANTIBODY SCREEN: CPT

## 2022-05-16 PROCEDURE — 88307 TISSUE EXAM BY PATHOLOGIST: CPT

## 2022-05-16 PROCEDURE — 85025 COMPLETE CBC W/AUTO DIFF WBC: CPT

## 2022-05-16 PROCEDURE — 86900 BLOOD TYPING SEROLOGIC ABO: CPT

## 2022-05-16 PROCEDURE — 36415 COLL VENOUS BLD VENIPUNCTURE: CPT

## 2022-05-16 RX ADMIN — HYDROCODONE BITARTRATE AND ACETAMINOPHEN PRN EA: 7.5; 325 TABLET ORAL at 13:07

## 2022-05-16 RX ADMIN — HYDROCODONE BITARTRATE AND ACETAMINOPHEN PRN EA: 7.5; 325 TABLET ORAL at 19:16

## 2022-05-16 RX ADMIN — ONDANSETRON PRN MG: 2 INJECTION, SOLUTION INTRAMUSCULAR; INTRAVENOUS at 19:13

## 2022-05-16 RX ADMIN — ONDANSETRON PRN MG: 2 INJECTION, SOLUTION INTRAMUSCULAR; INTRAVENOUS at 13:07

## 2022-05-16 RX ADMIN — SODIUM CHLORIDE, SODIUM LACTATE, POTASSIUM CHLORIDE, AND CALCIUM CHLORIDE SCH MLS/HR: 600; 310; 30; 20 INJECTION, SOLUTION INTRAVENOUS at 08:00

## 2022-05-16 RX ADMIN — SODIUM CHLORIDE, SODIUM LACTATE, POTASSIUM CHLORIDE, AND CALCIUM CHLORIDE SCH MLS/HR: 600; 310; 30; 20 INJECTION, SOLUTION INTRAVENOUS at 10:24

## 2022-05-16 RX ADMIN — DOCUSATE SODIUM PRN MG: 100 CAPSULE ORAL at 20:43

## 2022-05-16 RX ADMIN — KETOROLAC TROMETHAMINE PRN MG: 30 INJECTION, SOLUTION INTRAMUSCULAR; INTRAVENOUS at 09:27

## 2022-05-16 RX ADMIN — SODIUM CHLORIDE, SODIUM LACTATE, POTASSIUM CHLORIDE, AND CALCIUM CHLORIDE SCH MLS/HR: 600; 310; 30; 20 INJECTION, SOLUTION INTRAVENOUS at 07:21

## 2022-05-16 RX ADMIN — KETOROLAC TROMETHAMINE PRN MG: 30 INJECTION, SOLUTION INTRAMUSCULAR; INTRAVENOUS at 15:09

## 2022-05-16 RX ADMIN — KETOROLAC TROMETHAMINE PRN MG: 30 INJECTION, SOLUTION INTRAMUSCULAR; INTRAVENOUS at 20:43

## 2022-05-16 NOTE — DISCHARGE INST-WOMEN'S SERVICE
Discharge Inst-Women's Serv


Depart Medication/Instructions


New, Converted or Re-Newed RX:  Transmitted to Pharmacy


Problems Reviewed?:  Yes





Consults/Follow Up


Additional Follow Up:  Yes


Orders/Referrals


Dr. Santillan or Sonia in 7-10 days





Activity


Activity:  Activity as Tolerated


Driving Instructions:  No Driving for 1 Week


NO SMOKING:  NO SMOKING


Nothing Inside Vagina:  No Douching, No Konterra, No Tampons





Diet


Discharge Diet:  No Restrictions


Symptoms to Report to :  Bleeding Excessive, Pain Increased, Fever Over 101 

Degrees F, Vaginal Bleeding Increase, Questions/Concerns


For Any Problems or Questions:  Contact Your Physician





Skin/Wound Care


Infection Signs and Symptoms:  Increased Redness, Foul Odor of Wound, Increased 

Drainage, Skin Itchy or Has a Rash, Increased Swelling, Temperature Above 101  F


Operative Area Clean and Dry:  Keep Incision Clean/Dry


Stitches/Staples/Dermabond:  Dermabond, Care of Stitches


Bathing Instructions:  JANET Mason DO            May 16, 2022 07:08

## 2022-05-16 NOTE — HISTORY & PHYSICAL-SURGICAL
HPO-Surgical


History of Present Illness


Chief Complaint:  


Years of heavy bleeding and passing clots without improvement after trial


of conservative measures


Diagnosis/Surgical Indication:  AUB, MENORRHAGIA


Procedure:  


ROBOTIC ASSISTED TOTAL LAPAROSCOPIC HYSTERECTOMY WITH POSSIBLE BILATERAL 


SALPINO OOPHORECTOMY


Date of Surgery:  May 16, 2022


Weight (Pounds):  272


Weight (Ounces):  0.0


Height (Feet):  5


Height (Inches):  4.00





Allergies and Home Medications


Allergies


Coded Allergies:  


     No Known Drug Allergies (Unverified , 5/9/22)





Patient Home Medication List


Home Medication List Reviewed:  Yes


Acetaminophen (Acetaminophen) 500 Mg Tablet, 1,000 MG PO Q8H


   Prescribed by: VAMSHI JERNIGAN on 11/12/21 1322


Ibuprofen (Ibuprofen) 600 Mg Tablet, 600 MG PO Q6H


   Prescribed by: VAMSHI JERNIGAN on 11/12/21 1322


Multivitamin (Multivitamin) 1 Each Tablet, 1 EACH PO DAILY, (Reported)


   Entered as Reported by: ORION DEL ROSARIO on 11/11/21 0909


Discontinued Medications


Bupropion HCl (Wellbutrin Xl) 300 Mg Tab.er.24h, 300 MG PO DAILY, (Reported)


   Discontinued Reason: No Longer Taking


   Entered as Reported by: ORION DEL ROSARIO on 11/11/21 0909


Norethindrone Acetate (Aygestin) 5 Mg Tablet, 5 MG PO DAILY, (Reported)


   Discontinued Reason: No Longer Taking


   Entered as Reported by: ORION DEL ROSARIO on 11/11/21 0909


Oxycodone Hcl (Oxyir Tablet) 5 Mg Tab, 5 MG PO Q6H


   Discontinued Reason: No Longer Taking


   Prescribed by: VAMSHI JERNIGAN on 11/12/21 1323





Past Medical-Social-Family Hx


Patient Social History


Smoking Status:  Former Smoker


Former Smoker, Quit:  Charan 3, 2007


2nd Hand Smoke Exposure:  No


Recent Hopitalizations:  No





Immunizations Up To Date


Tetanus Booster (TDap):  More than 5yrs


Date of Influenza Vaccine:  Jan 9, 2022





Seasonal Allergies


Seasonal Allergies:  No





Surgeries


Yes (endometriosis, ovarian cyst removal, d&c x7, uterine septumectomy)


Adenoidectomy, Cystectomy, Gallbladder, Oophorectomy, Tonsillectomy





Respiratory


No


Currently Using CPAP:  No


Currently Using BIPAP:  No





Cardiovascular


Yes (HYPOTENSIVE WITH SURGERY)





Neurological


Yes (EPILEPSY AS CHILD)





Reproductive System


Hx Reproductive Disorders:  Yes (DUB, OVARIAN CYSTS, SEPTUM REMOVED FROM UTERUS;

MULTIPLE MISCARRIAGES)


Sexually Transmitted Disease:  No


HIV/AIDS:  No


Female Reproductive Disorders:  Endometriosis, Ovarian Cyst





Genitourinary


No





Gastrointestinal


Yes


Gall Bladder Disease





Musculoskeletal


No





Endocrine


History of Endocrine Disorders:  No





HEENT


History of HEENT Disorders:  Yes


HEENT Disorders:  Tonsilitis


Loss of Vision:  Bilateral


Hearing Impairment:  Denies





Cancer


No





Psychosocial


History of Psychiatric Problem:  No





Integumentary


History of Skin or Integumenta:  No





Blood Transfusions


History of Blood Disorders:  No





Family Medical History


Family Hx:  


Asthma


  19 MOTHER


  maternal gdma


Cardiovascular disease


  maternal gdma


Completed stroke


  MATERNAL GDPA


Deafness or hearing loss


  19 MOTHER


Diabetes mellitus


  19 FATHER


Myocardial infarction


  maternal gdma


  MATERNAL GDPA


Psychosocial problem


  19 MOTHER


Respiratory disorder


  19 MOTHER


  maternal gdma


Seizure disorder


  19 MOTHER (EPILEPSY)


  G8 BROTHER (EPILEPSY)





No Family History of:


  AIDS


  Abdominal aortic aneurysm


  Harding's disease


  Alcoholism


  Alzheimer's disease


  Aphasia


  Arthritis


  Cancer of mouth


  Cataracts


  Colon cancer


  Congenital disease


  Congenital heart disease


  Coronary thrombosis


  Cystic fibrosis


  Dementia


  Drug abuse


  Dysphasia


  Fibrocystic disease of breast


  Gastroenteritis


  Glaucoma


  Headache disorder


  Hypercholesterolemia


  Hypertension


  Infertility


  Kidney disease


  Neoplasm


  Not obtainable due to adoption


  Osteoporosis


  Parkinson's disease


  Prostate cancer


  Severe allergy


  Thyroid disease


  Tuberculosis


  Visual disorder





Exam


Vital Signs





Vital Signs








 5/16/22





 06:15


 


Temp 36.2


 


Pulse 70


 


Resp 16


 


B/P (MAP) 111/66 (81)


 


Pulse Ox 99


 


O2 Delivery Room Air





Capillary Refill :


Labs





Laboratory Tests








Test


 5/16/22


06:30 Range/Units


 


 


White Blood Count


 8.5 


 4.3-11.0


10^3/uL


 


Red Blood Count


 4.34 


 3.80-5.11


10^6/uL


 


Hemoglobin 14.0  11.5-16.0  g/dL


 


Hematocrit 40  35-52  %


 


Mean Corpuscular Volume 92  80-99  fL


 


Mean Corpuscular Hemoglobin 32  25-34  pg


 


Mean Corpuscular Hemoglobin


Concent 35 


 32-36  g/dL





 


Red Cell Distribution Width 12.9  10.0-14.5  %


 


Platelet Count


 292 


 130-400


10^3/uL


 


Mean Platelet Volume 9.4  9.0-12.2  fL


 


Immature Granulocyte % (Auto) 1   %


 


Neutrophils (%) (Auto) 58  42-75  %


 


Lymphocytes (%) (Auto) 31  12-44  %


 


Monocytes (%) (Auto) 8  0-12  %


 


Eosinophils (%) (Auto) 1  0-10  %


 


Basophils (%) (Auto) 0  0-10  %


 


Neutrophils # (Auto)


 5.0 


 1.8-7.8


10^3/uL


 


Lymphocytes # (Auto)


 2.7 


 1.0-4.0


10^3/uL


 


Monocytes # (Auto)


 0.7 


 0.0-1.0


10^3/uL


 


Eosinophils # (Auto)


 0.1 


 0.0-0.3


10^3/uL


 


Basophils # (Auto)


 0.0 


 0.0-0.1


10^3/uL


 


Immature Granulocyte # (Auto)


 0.1 


 0.0-0.1


10^3/uL


 


Prothrombin Time 12.8  12.2-14.7  SEC


 


INR Comment 0.9  0.8-1.4  








General Appearance:  Alert, Oriented X3


HEENT:  Atraumatic


Respiratory:  Clear to Auscultation


Cardiovascular:  Regular Rate


Abdominal:  Soft


Extremities:  No Edema


Skin:  No Rashes


Neuro:  Normal Gait, Normal Speech


Psych/Mental Status:  Mental Status NL





Assessment/Plan


Assessment and Plan


Diagnosis


30 yo w/ AUB- Menorragia


BMI > 35








P:


RATLH w/ poss BSO





Admission Diagnosis


Admission Status:  Observation











JANET VERA DO            May 16, 2022 07:04

## 2022-05-16 NOTE — OPERATIVE REPORT
DATE OF SERVICE:  



PREOPERATIVE DIAGNOSES:

1.  A 31-year-old female with abnormal uterine bleeding.

2.  Menorrhagia.

3.  BMI greater than 35.



POSTOPERATIVE DIAGNOSES:

1.  A 31-year-old female with abnormal uterine bleeding.

2.  Menorrhagia.

3.  BMI greater than 35.



PROCEDURE:

Robotic-assisted total laparoscopic hysterectomy with bilateral salpingectomy.



SURGEON:

Erik Vera DO



ASSISTANT:

Sonia Nam DNP, was necessary for manipulation and retraction throughout

the procedure.



ANESTHESIA:

General endotracheal.



ESTIMATED BLOOD LOSS:

50 mL.



URINE OUTPUT:

200 mL clear at the end of the procedure.



FLUIDS:

2700 mL lactated Ringer's solution.



FINDINGS:

A grossly normal appearing external female genitalia:  Normal appearing cervix,

grossly normal appearing bilateral ovaries, slightly hyperemic and enlarged

uterus, bilateral normal fallopian tubes.



SPECIMEN SENT:

Uterus, bilateral fallopian tubes.



INDICATIONS FOR PROCEDURE:

This 31-year-old female was a patient who had sought care in our office for

abnormal uterine bleeding.  She has underwent multiple other treatment

management strategies that were much more conservative including oral

contraceptive pills, Depo-Provera, IUD placement, all of these were been done

throughout her lifetime without any improvement.  I discussed with the patient

as she requested definitive measures hysterectomy, risks of the procedure were

discussed with the patient in detail including risk of bleeding, infection,

damaging structures including but not limited to bowel, bladder, ureter,

kidneys, possible need for reoperation, postoperative complications that may

occur, risk from anesthesia and even death.  After everything was discussed with

the patient in detail, consent was obtained in the preoperative area, the

patient was taken to the operating room.



OPERATIVE REPORT IN DETAIL:

Once in the operating room, anesthesia was found to be adequate.  She was placed

in the dorsal lithotomy position, prepped and draped in normal sterile fashion. 

A timeout was performed.  Diaz catheter was placed using sterile technique.  A

weighted speculum was inserted to the patient's vagina.  Right angle retractor

was used to visualize the cervix, which was grasped at 12 o'clock position using

a 0 Vicryl suture through the 12 o'clock position on the cervix.  I then sound

the uterine cavity, depth was found to be 8 cm.  I selected a Davina uterine

manipulator with an tip of 8 cm and a 3.5 cm colpotomy ring.  I placed the

uterine manipulator tip into the uterus deploying the balloon and advanced the

vaginal colpotomy ring around the fornix of the vagina.  I removed all the other

instruments from the patient's vagina, performed change of gloves obtained my

attention to the abdomen, where at the midclavicular line subcostally 2

fingerbreadths, I inserted a Veress needle through the skin until

intraperitoneal placement was confirmed using a saline drop test.  I then

proceeded with insufflation using CO2 gas and opening pressure of 5 mmHg was

noted.  I proceeded to maximum pressure of 15 mmHg, at which point, I made an

infraumbilical incision with a knife and directed an 8 mm da Enoch camera trocar

through the incision until intraperitoneal placement was confirmed using da

Enoch laparoscope.  There was no evidence of damage upon my entry site.  Brief

scan of the upper abdominal anatomy appears to be grossly normal.  The Veress

needle did not appear to have caused any injury and it was removed under direct

visualization of laparoscope.  I then had the patient placed in steep

Trendelenburg, I am able to visualize all my pelvic anatomy as defined in my

findings above.  I placed two lateral trocars using both 8 mm trocars

approximately 10 cm lateral to my infraumbilical trocar.  Once both of these

trocars were in placed after incisions were made through the skin and the

trocars were placed under direct visualization and laparoscope.  I then brought

in the da Enoch robot and docked in appropriate fashion placing the SynchroSeal

device in left hand and monopolar sunni in the right hand and performed the

following dissection bilaterally.  Starting at the uteroovarian ligament, I

sealed and transected using the SynchroSeal device.  I then created window in

the middle of the mesosalpinx and took this laterally down the mesosalpinx

amputating the fallopian tube from its surrounding blood supply.  I then grasped

the round ligament, which I sealed and transected using the SynchroSeal device. 

I then grasped the entire broad ligament, which I sealed and transected using

the SynchroSeal device down to the level of the lower uterine segment, at which

point I  the anterior and posterior leaflets of the broad ligament,

anterior leaflet was taken around to the anterior vaginal fornix and posterior

leaflets was taken around to the posterior vaginal fornix.  This allows me to

skeletonize the uterine vessels laterally, which I sealed and transected using

the SynchroSeal device.  I then created a colpotomy at 12 o'clock position using

monopolar usnni and took this circumferentially around the vaginal fornix

amputating the cervix away from the vagina.  The entire specimen is then removed

through the vagina.  I then closed the lateral vaginal apices of the vaginal

cuff using 2-0 Vicryl suture in a figure-of-eight fashion colposuspending them

to the uterosacral ligaments.  I then closed the remainder of the vaginal cuff

using 2-0 V-Loc in a running fashion.  There was no active bleeding noted from

any of my dissection planes after I completed this.  I then undocked the da

Enoch robot and proceeded with remainder of the case laparoscopically, I

copiously irrigate the pelvis using normal saline.  Once again, there was no

active bleeding noted from any of my dissection planes.  I placed FloSeal

hemostatic agent over all my planes of dissection to ensure excellent

postoperative hemostasis.  I then had the patient taken out of steep

Trendelenburg where I released insufflation and removed the lateral trocars

under regulation laparoscope, infraumbilical trocars left in place to release

the remainder of the insufflation and to introduce 10 mL of 0.25% Marcaine into

peritoneal cavity for postoperative pain management.  I then removed this trocar

as well.  The skin reapproximated using 4-0 Monocryl in interrupted subcuticular

stitches.  Dermabond was applied to all the incisions and puncture wounds. 

Band-Aids were placed over the incisions.  Diaz catheter was left in place. 

The patient tolerated the procedure well and was taken to recovery area in

stable condition.  Lap and sponge counts were correct at the end of the

procedure.  Instrument counts correct as well.  Two grams of Ancef, 500 mg of

Flagyl were given preoperatively for infection prophylaxis.





Job ID: [id number]

DocumentID: [id number]

Dictated Date:  05/16/2022 09:48:37

Transcription Date: 05/16/2022 14:58:01

Dictated By: ERIK VERA DO Keven-9 years to 21 years...

## 2022-05-17 VITALS — SYSTOLIC BLOOD PRESSURE: 104 MMHG | DIASTOLIC BLOOD PRESSURE: 54 MMHG

## 2022-05-17 VITALS — DIASTOLIC BLOOD PRESSURE: 53 MMHG | SYSTOLIC BLOOD PRESSURE: 94 MMHG

## 2022-05-17 VITALS — SYSTOLIC BLOOD PRESSURE: 94 MMHG | DIASTOLIC BLOOD PRESSURE: 53 MMHG

## 2022-05-17 RX ADMIN — DOCUSATE SODIUM PRN MG: 100 CAPSULE ORAL at 08:57

## 2022-05-17 RX ADMIN — KETOROLAC TROMETHAMINE PRN MG: 30 INJECTION, SOLUTION INTRAMUSCULAR; INTRAVENOUS at 03:17

## 2022-05-17 RX ADMIN — HYDROCODONE BITARTRATE AND ACETAMINOPHEN PRN EA: 7.5; 325 TABLET ORAL at 11:03

## 2022-05-17 NOTE — ANESTHESIA-GENERAL POST-OP
General


Patient Condition


Mental Status/LOC:  Same as Preop


Cardiovascular:  Satisfactory


Nausea/Vomiting:  Absent


Respiratory:  Satisfactory


Pain:  Controlled


Complications:  Absent





Post Op Complications


Complications


None





Follow Up Care/Instructions


Patient Instructions


None needed.





Anesthesia/Patient Condition


Patient Condition


Patient is doing well, no complaints, stable vital signs, no apparent adverse 

anesthesia problems.   


No complications reported per nursing.











JULIANNA AUGUSTE CRNA            May 17, 2022 10:06

## 2022-06-04 ENCOUNTER — HOSPITAL ENCOUNTER (EMERGENCY)
Dept: HOSPITAL 75 - ER | Age: 32
Discharge: HOME | End: 2022-06-04
Payer: COMMERCIAL

## 2022-06-04 VITALS — WEIGHT: 244.71 LBS | HEIGHT: 64.96 IN | BODY MASS INDEX: 40.77 KG/M2

## 2022-06-04 VITALS — SYSTOLIC BLOOD PRESSURE: 112 MMHG | DIASTOLIC BLOOD PRESSURE: 69 MMHG

## 2022-06-04 DIAGNOSIS — Z90.49: ICD-10-CM

## 2022-06-04 DIAGNOSIS — Z90.710: ICD-10-CM

## 2022-06-04 DIAGNOSIS — Z90.722: ICD-10-CM

## 2022-06-04 DIAGNOSIS — Z20.822: ICD-10-CM

## 2022-06-04 DIAGNOSIS — K52.9: Primary | ICD-10-CM

## 2022-06-04 LAB
ALBUMIN SERPL-MCNC: 4.1 GM/DL (ref 3.2–4.5)
ALP SERPL-CCNC: 65 U/L (ref 40–136)
ALT SERPL-CCNC: 27 U/L (ref 0–55)
APTT BLD: 20 SEC (ref 24–35)
APTT PPP: YELLOW S
BACTERIA #/AREA URNS HPF: NEGATIVE /HPF
BASOPHILS # BLD AUTO: 0 10^3/UL (ref 0–0.1)
BASOPHILS NFR BLD AUTO: 0 % (ref 0–10)
BILIRUB SERPL-MCNC: 0.4 MG/DL (ref 0.1–1)
BILIRUB UR QL STRIP: NEGATIVE
BUN/CREAT SERPL: 16
CALCIUM SERPL-MCNC: 9.4 MG/DL (ref 8.5–10.1)
CHLORIDE SERPL-SCNC: 106 MMOL/L (ref 98–107)
CO2 SERPL-SCNC: 18 MMOL/L (ref 21–32)
CREAT SERPL-MCNC: 0.58 MG/DL (ref 0.6–1.3)
EOSINOPHIL # BLD AUTO: 0.3 10^3/UL (ref 0–0.3)
EOSINOPHIL NFR BLD AUTO: 4 % (ref 0–10)
ERYTHROCYTE [SEDIMENTATION RATE] IN BLOOD: 25 MM/HR (ref 0–20)
FIBRINOGEN PPP-MCNC: CLEAR MG/DL
GFR SERPLBLD BASED ON 1.73 SQ M-ARVRAT: 123 ML/MIN
GLUCOSE SERPL-MCNC: 88 MG/DL (ref 70–105)
GLUCOSE UR STRIP-MCNC: NEGATIVE MG/DL
HCT VFR BLD CALC: 39 % (ref 35–52)
HGB BLD-MCNC: 13.4 G/DL (ref 11.5–16)
INR PPP: 0.9 (ref 0.8–1.4)
KETONES UR QL STRIP: NEGATIVE
LEUKOCYTE ESTERASE UR QL STRIP: NEGATIVE
LYMPHOCYTES # BLD AUTO: 1.3 10^3/UL (ref 1–4)
LYMPHOCYTES NFR BLD AUTO: 15 % (ref 12–44)
MAGNESIUM SERPL-MCNC: 1.7 MG/DL (ref 1.6–2.4)
MANUAL DIFFERENTIAL PERFORMED BLD QL: NO
MCH RBC QN AUTO: 32 PG (ref 25–34)
MCHC RBC AUTO-ENTMCNC: 35 G/DL (ref 32–36)
MCV RBC AUTO: 91 FL (ref 80–99)
MONOCYTES # BLD AUTO: 0.8 10^3/UL (ref 0–1)
MONOCYTES NFR BLD AUTO: 9 % (ref 0–12)
NEUTROPHILS # BLD AUTO: 6.4 10^3/UL (ref 1.8–7.8)
NEUTROPHILS NFR BLD AUTO: 72 % (ref 42–75)
NITRITE UR QL STRIP: NEGATIVE
PH UR STRIP: 6 [PH] (ref 5–9)
PLATELET # BLD: 303 10^3/UL (ref 130–400)
PMV BLD AUTO: 9.5 FL (ref 9–12.2)
POTASSIUM SERPL-SCNC: 3.8 MMOL/L (ref 3.6–5)
PROT SERPL-MCNC: 7.1 GM/DL (ref 6.4–8.2)
PROT UR QL STRIP: NEGATIVE
PROTHROMBIN TIME: 12.7 SEC (ref 12.2–14.7)
RBC #/AREA URNS HPF: (no result) /HPF
RENAL EPI CELLS #/AREA URNS HPF: (no result) /HPF
SODIUM SERPL-SCNC: 139 MMOL/L (ref 135–145)
SP GR UR STRIP: >=1.03 (ref 1.02–1.02)
SQUAMOUS #/AREA URNS HPF: (no result) /HPF
WBC # BLD AUTO: 8.9 10^3/UL (ref 4.3–11)
WBC #/AREA URNS HPF: (no result) /HPF

## 2022-06-04 PROCEDURE — 81000 URINALYSIS NONAUTO W/SCOPE: CPT

## 2022-06-04 PROCEDURE — 51701 INSERT BLADDER CATHETER: CPT

## 2022-06-04 PROCEDURE — 87636 SARSCOV2 & INF A&B AMP PRB: CPT

## 2022-06-04 PROCEDURE — 87040 BLOOD CULTURE FOR BACTERIA: CPT

## 2022-06-04 PROCEDURE — 85652 RBC SED RATE AUTOMATED: CPT

## 2022-06-04 PROCEDURE — 84145 PROCALCITONIN (PCT): CPT

## 2022-06-04 PROCEDURE — 83605 ASSAY OF LACTIC ACID: CPT

## 2022-06-04 PROCEDURE — 86141 C-REACTIVE PROTEIN HS: CPT

## 2022-06-04 PROCEDURE — 36415 COLL VENOUS BLD VENIPUNCTURE: CPT

## 2022-06-04 PROCEDURE — 74177 CT ABD & PELVIS W/CONTRAST: CPT

## 2022-06-04 PROCEDURE — 80053 COMPREHEN METABOLIC PANEL: CPT

## 2022-06-04 PROCEDURE — 85730 THROMBOPLASTIN TIME PARTIAL: CPT

## 2022-06-04 PROCEDURE — 85610 PROTHROMBIN TIME: CPT

## 2022-06-04 PROCEDURE — 85025 COMPLETE CBC W/AUTO DIFF WBC: CPT

## 2022-06-04 PROCEDURE — 71045 X-RAY EXAM CHEST 1 VIEW: CPT

## 2022-06-04 PROCEDURE — 87088 URINE BACTERIA CULTURE: CPT

## 2022-06-04 PROCEDURE — 71260 CT THORAX DX C+: CPT

## 2022-06-04 PROCEDURE — 83735 ASSAY OF MAGNESIUM: CPT

## 2022-06-04 NOTE — DIAGNOSTIC IMAGING REPORT
INDICATION: Chest pain.



EXAMINATION: Portable chest at 8:10 PM.



Heart size and pulmonary vascularity are normal. Lungs are clear.

There are no effusions or pneumothoraces.



IMPRESSION: No acute abnormalities in the chest. 



Dictated by: 



  Dictated on workstation # [id number]

## 2022-06-04 NOTE — DIAGNOSTIC IMAGING REPORT
PROCEDURE: CT chest, abdomen, and pelvis with contrast.



TECHNIQUE: Multiple contiguous axial images were obtained through

the chest, abdomen, and pelvis after the administration of

intravenous contrast. Auto Exposure Controls were utilized during

the CT exam to meet ALARA standards for radiation dose reduction.





INDICATION: Postop abdominal pain and fever.



CT CHEST: Lungs are clear. There are no effusions or

pneumothoraces. There is no hilar or mediastinal lymphadenopathy.



IMPRESSION: Negative CT chest.



CT ABDOMEN/PELVIS: Liver is normal. Gallbladder is surgically

absent. Pancreas is normal. Spleen is unremarkable. Kidneys and

adrenals appear normal. Small bowel is not dilated. The appendix

is normal. Colon is unremarkable. There is a small left ovarian

cyst. Uterus is surgically absent. There are no pathologic masses

or fluid collections seen in the pelvis. There is no

intraperitoneal free air or free fluid.



IMPRESSION: No acute abnormality is seen in the abdomen or

pelvis. 



Dictated by: 



  Dictated on workstation # [id number] assessment of oropharyngeal dysphagia to assess swallow mechanism

## 2022-06-04 NOTE — ED GENERAL
General


Stated Complaint:  POST HYSTERECTOMY/PAIN/DISCHARGE/FEVER


Source of Information:  Patient





History of Present Illness


Date Seen by Provider:  Jun 4, 2022


Time Seen by Provider:  19:45


Initial Comments


PT ARRIVES VIA POV FROM HOME


PT HAD ROBOTIC-ASSISTED LAPAROSCOPIC HYSTERECTOMY BY DR. VERA ON 05/16/22


PT HAS BEEN MOVING THIS WEEK


STATES SHE WAS DOING GREAT UNTIL 3 DAYS AGO


STATES FOR THE LAST 3 DAYS  SHE HAS HAD :


-FEVER UP 


-NAUSEA AND VOMITING AND DIARRHEA. VOMITED X 3 TODAY, DIARRHEA AT LEAST 7-8 

TIMES TODAY, INCLUDING JUST BEFORE SHE LEFT HOME AND AGAIN ON ARRIVAL HERE


-LOWER ABDOMINAL PAIN 


-LOWER BACK PAIN 


-CLEAR WHITE VAGINAL DISCHARGE





TOOK  MG IBUPROFEN 45 MINUTES PRIOR TO ARRIVAL





Allergies and Home Medications


Allergies


Coded Allergies:  


     No Known Drug Allergies (Unverified , 5/9/22)





Patient Home Medication List


Docusate Sodium (Docusate Sodium) 100 Mg Capsule, 100 MG PO BID PRN for 

CONSTIPATION-1ST LINE


   Prescribed by: JANET VERA on 5/16/22 0710


Hydrocodone Bit/Acetaminophen (HYDROcodone/APAP 7.5/325 TAB) 1 Ea Tablet, 1-2 EA

PO Q6HR PRN for PAIN-MODERATE (5-7)


   Prescribed by: JANET VERA on 5/16/22 0710


Ibuprofen (Ibu) 600 Mg Tablet, 600 MG PO Q6HR


   Prescribed by: JANET EVRA on 5/16/22 0710


Multivitamin (Multivitamin) 1 Each Tablet, 1 EACH PO DAILY, (Reported)


   Entered as Reported by: ORION DEL ROSARIO on 11/11/21 0909


Simethicone (Simethicone) 80 Mg Tab.chew, 40 MG PO TID PRN for INDIGESTION 2ND 

LINE


   Prescribed by: JANET VERA on 5/16/22 0710





Past Medical-Social-Family Hx


Immunizations Up To Date


Tetanus Booster (TDap):  More than 5yrs


First/Initial COVID19 Vaccinat:  2/21


Second COVID19 Vaccination Francisco J:  3/21


Third COVID19 Vaccination Date:  2/21





Seasonal Allergies


Seasonal Allergies:  No





Past Medical History


Surgery/Hospitalization HX:  


PCOS, D&Cx8, t/a, cholecystectomy


Surgeries:  Yes (endometriosis, ovarian cyst removal, d&c x7, uterine 

septumectomy)


Adenoidectomy, Cystectomy, Gallbladder, Oophorectomy, Tonsillectomy


Respiratory:  No


Currently Using CPAP:  No


Currently Using BIPAP:  No


Cardiac:  Yes (HYPOTENSIVE WITH SURGERY)


Neurological:  Yes (EPILEPSY AS CHILD)


Reproductive Disorders:  Yes (DUB, OVARIAN CYSTS, SEPTUM REMOVED FROM UTERUS; 

MULTIPLE MISCARRIAGES)


Female Reproductive Disorders:  Endometriosis, Ovarian Cyst


Sexually Transmitted Disease:  No


HIV/AIDS:  No


Genitourinary:  No


Gastrointestinal:  Yes


Gall Bladder Disease


Musculoskeletal:  No


Endocrine:  No


HEENT:  Yes


Tonsilitis


Loss of Vision:  Bilateral


Hearing Impairment:  Denies


Cancer:  No


Psychosocial:  No


Integumentary:  No


Blood Disorders:  No





Family Medical History





Asthma


  19 MOTHER


  maternal gdma


Cardiovascular disease


  maternal gdma


Completed stroke


  MATERNAL GDPA


Deafness or hearing loss


  19 MOTHER


Diabetes mellitus


  19 FATHER


Myocardial infarction


  maternal gdma


  MATERNAL GDPA


Psychosocial problem


  19 MOTHER


Respiratory disorder


  19 MOTHER


  maternal gdma


Seizure disorder


  19 MOTHER (EPILEPSY)


  G8 BROTHER (EPILEPSY)





No Family History of:


  AIDS


  Abdominal aortic aneurysm


  [place]'s disease


  Alcoholism


  Alzheimer's disease


  Aphasia


  Arthritis


  Cancer of mouth


  Cataracts


  Colon cancer


  Congenital disease


  Congenital heart disease


  Coronary thrombosis


  Cystic fibrosis


  Dementia


  Drug abuse


  Dysphasia


  Fibrocystic disease of breast


  Gastroenteritis


  Glaucoma


  Headache disorder


  Hypercholesterolemia


  Hypertension


  Infertility


  Kidney disease


  Neoplasm


  Not obtainable due to adoption


  Osteoporosis


  Parkinson's disease


  Prostate cancer


  Severe allergy


  Thyroid disease


  Tuberculosis


  Visual disorder





Physical Exam


Vital Signs





Vital Signs - First Documented








 6/4/22





 19:40


 


Temp 37.2


 


Pulse 98


 


Resp 16


 


B/P (MAP) 112/69 (83)


 


Pulse Ox 97


 


O2 Delivery Room Air





Capillary Refill :


Height, Weight, BMI


Height: 5'4.00"


Weight: 272lbs. 0.0oz. 123.232367oy; 38.33 BMI


Method:Stated





Focused Exam


Lactate Level


6/4/22 20:00: Lactic Acid Level 1.36





Lactic Acid Level





Laboratory Tests








Test


 6/4/22


20:00


 


Lactic Acid Level


 1.36 MMOL/L


(0.50-2.00)











Progress/Results/Core Measures


Suspected Sepsis


SIRS


Temperature: 


Pulse:  


Respiratory Rate: 


 


Laboratory Tests


6/4/22 20:00: White Blood Count 8.9


Blood Pressure  / 


Mean: 


 





6/4/22 20:00: Lactic Acid Level 1.36








Laboratory Tests


6/4/22 20:00: 


Creatinine 0.58L, INR Comment 0.9, Platelet Count 303, Total Bilirubin 0.4





Results/Orders


Lab Results





Laboratory Tests








Test


 6/4/22


19:50 6/4/22


20:00 6/4/22


21:45 Range/Units


 


 


Influenza Type A (RT-PCR) Not Detected    Not Detecte  


 


Influenza Type B (RT-PCR) Not Detected    Not Detecte  


 


SARS-CoV-2 RNA (RT-PCR) Not Detected    Not Detecte  


 


White Blood Count


 


 8.9 


 


 4.3-11.0


10^3/uL


 


Red Blood Count


 


 4.21 


 


 3.80-5.11


10^6/uL


 


Hemoglobin  13.4   11.5-16.0  g/dL


 


Hematocrit  39   35-52  %


 


Mean Corpuscular Volume  91   80-99  fL


 


Mean Corpuscular Hemoglobin  32   25-34  pg


 


Mean Corpuscular Hemoglobin


Concent 


 35 


 


 32-36  g/dL





 


Red Cell Distribution Width  12.8   10.0-14.5  %


 


Platelet Count


 


 303 


 


 130-400


10^3/uL


 


Mean Platelet Volume  9.5   9.0-12.2  fL


 


Immature Granulocyte % (Auto)  0    %


 


Neutrophils (%) (Auto)  72   42-75  %


 


Lymphocytes (%) (Auto)  15   12-44  %


 


Monocytes (%) (Auto)  9   0-12  %


 


Eosinophils (%) (Auto)  4   0-10  %


 


Basophils (%) (Auto)  0   0-10  %


 


Neutrophils # (Auto)


 


 6.4 


 


 1.8-7.8


10^3/uL


 


Lymphocytes # (Auto)


 


 1.3 


 


 1.0-4.0


10^3/uL


 


Monocytes # (Auto)


 


 0.8 


 


 0.0-1.0


10^3/uL


 


Eosinophils # (Auto)


 


 0.3 


 


 0.0-0.3


10^3/uL


 


Basophils # (Auto)


 


 0.0 


 


 0.0-0.1


10^3/uL


 


Immature Granulocyte # (Auto)


 


 0.0 


 


 0.0-0.1


10^3/uL


 


Erythrocyte Sedimentation Rate  25 H  0-20  MM/HR


 


Prothrombin Time  12.7   12.2-14.7  SEC


 


INR Comment  0.9   0.8-1.4  


 


Activated Partial


Thromboplast Time 


 20 L


 


 24-35  SEC





 


Sodium Level  139   135-145  MMOL/L


 


Potassium Level  3.8   3.6-5.0  MMOL/L


 


Chloride Level  106     MMOL/L


 


Carbon Dioxide Level  18 L  21-32  MMOL/L


 


Anion Gap  15 H  5-14  MMOL/L


 


Blood Urea Nitrogen  9   7-18  MG/DL


 


Creatinine


 


 0.58 L


 


 0.60-1.30


MG/DL


 


Estimat Glomerular Filtration


Rate 


 123 


 


  





 


BUN/Creatinine Ratio  16    


 


Glucose Level  88     MG/DL


 


Lactic Acid Level


 


 1.36 


 


 0.50-2.00


MMOL/L


 


Calcium Level  9.4   8.5-10.1  MG/DL


 


Corrected Calcium  9.3   8.5-10.1  MG/DL


 


Magnesium Level  1.7   1.6-2.4  MG/DL


 


Total Bilirubin  0.4   0.1-1.0  MG/DL


 


Aspartate Amino Transf


(AST/SGOT) 


 18 


 


 5-34  U/L





 


Alanine Aminotransferase


(ALT/SGPT) 


 27 


 


 0-55  U/L





 


Alkaline Phosphatase  65     U/L


 


C-Reactive Protein High


Sensitivity 


 1.64 H


 


 0.00-0.50


MG/DL


 


Total Protein  7.1   6.4-8.2  GM/DL


 


Albumin  4.1   3.2-4.5  GM/DL


 


Procalcitonin  0.05   <0.10  NG/ML


 


Urine Color   YELLOW   


 


Urine Clarity   CLEAR   


 


Urine pH   6.0  5-9  


 


Urine Specific Gravity   >=1.030  1.016-1.022  


 


Urine Protein   NEGATIVE  NEGATIVE  


 


Urine Glucose (UA)   NEGATIVE  NEGATIVE  


 


Urine Ketones   NEGATIVE  NEGATIVE  


 


Urine Nitrite   NEGATIVE  NEGATIVE  


 


Urine Bilirubin   NEGATIVE  NEGATIVE  


 


Urine Urobilinogen   0.2  < = 1.0  MG/DL


 


Urine Leukocyte Esterase   NEGATIVE  NEGATIVE  


 


Urine RBC (Auto)   1+ H NEGATIVE  


 


Urine RBC   5-10 H  /HPF


 


Urine WBC   2-5   /HPF


 


Urine Squamous Epithelial


Cells 


 


 2-5 


  /HPF





 


Urine Renal Epithelial Cells   NONE   /HPF


 


Urine Crystals   NONE   /LPF


 


Urine Bacteria   NEGATIVE   /HPF


 


Urine Casts   NONE   /LPF


 


Urine Mucus   LARGE H  /LPF


 


Urine Culture Indicated


 


 


 CULTURE


PENDING  











My Orders





Orders - MASOUD GUADARRAMA DO


Ed Iv/Invasive Line Start (6/4/22 19:48)


Monitor-Rhythm Ecg Trace Only (6/4/22 19:48)


Straight Cath For Spec.-Adult (6/4/22 19:48)


Chest 1 View, Ap/Pa Only (6/4/22 19:48)


Cbc With Automated Diff (6/4/22 19:48)


Comprehensive Metabolic Panel (6/4/22 19:48)


Hs C Reactive Protein (6/4/22 19:48)


Lactic Acid Analyzer (6/4/22 19:48)


Magnesium (6/4/22 19:48)


Procalcitonin (Pct) (6/4/22 19:48)


Protime With Inr (6/4/22 19:48)


Partial Thromboplastin Time (6/4/22 19:48)


Ua Culture If Indicated (6/4/22 19:48)


Erythrocyte Sedimentation Rate (6/4/22 19:48)


Covid 19 Inhouse Test (6/4/22 19:48)


Blood Culture (6/4/22 19:48)


Sputum Culture (6/4/22 19:48)


Urine Culture (6/4/22 19:48)


Ed Iv/Invasive Line Start (6/4/22 19:48)


Ed Iv/Invasive Line Start (6/4/22 19:48)


Vital Signs Adult Sepsis Patie Q15M (6/4/22 19:48)


O2 (6/4/22 19:48)


Remove Rings In Anticipation O (6/4/22 19:48)


Influenza A And B By Pcr (6/4/22 19:48)


Isolation Central Supply Req (6/4/22 19:48)


Ed Iv/Invasive Line Start (6/4/22 19:48)


Lactated Ringers (Lr 1000 Ml Iv Solution (6/4/22 20:00)


Ct Chest/Abdomen/Pelvis W (6/4/22 21:28)


Stool Culture (6/4/22 21:38)


Fecal Wbc (6/4/22 21:38)


C Difficile Ag + Toxin A/B. (6/4/22 21:38)


Isolation Central Supply Req (6/4/22 21:38)


Iohexol Injection (Omnipaque 350 Mg/Ml 1 (6/4/22 22:45)


Sodium Chloride Flush (Catheter Flush Sy (6/4/22 22:45)


Ns (Ivpb) (Sodium Chloride 0.9% Ivpb Bag (6/4/22 22:45)





Medications Given in ED





Current Medications








 Medications  Dose


 Ordered  Sig/Agnieszka


 Route  Start Time


 Stop Time Status Last Admin


Dose Admin


 


 Iohexol  100 ml  ONCE  ONCE


 IV  6/4/22 22:45


 6/4/22 22:46 DC 6/4/22 22:38


100 ML


 


 Lactated Ringer's  1,000 ml @ 


 0 mls/hr  Q0M ONCE


 IV  6/4/22 20:00


 6/4/22 20:01 DC 6/4/22 20:05


0 MLS/HR


 


 Sodium Chloride  10 ml  AS NEEDED  PRN


 IV  6/4/22 22:45


    6/4/22 22:38


10 ML


 


 Sodium Chloride  100 ml  ONCE  ONCE


 IV  6/4/22 22:45


 6/4/22 22:46 DC 6/4/22 22:38


80 ML








Vital Signs/I&O











 6/4/22





 19:40


 


Temp 37.2


 


Pulse 98


 


Resp 16


 


B/P (MAP) 112/69 (83)


 


Pulse Ox 97


 


O2 Delivery Room Air





Capillary Refill :





Departure


Impression





   Primary Impression:  


   Acute gastroenteritis


Disposition:  01 HOME, SELF-CARE


Condition:  Improved





Departure-Patient Inst.


Decision time for Depature:  23:05


Referrals:  


JANET VERA DANIEL J MD (PCP/Family)


Primary Care Physician


Patient Instructions:  Viral Gastroenteritis, Adult (DC)





Add. Discharge Instructions:  


CLEAR LIQUIDS--WATER, BROTH, JELLO, GATORADE


BRATS DIET--BANANAS, RICE, APPLESAUCE, TOAST, SALTINES





TAKE YOUR HOME PAIN MEDICATION AS NEEDED





TYLENOL AND MOTRIN AS NEEDED FOR PAIN OR FEVER





FOLLOW UP WITH DR. CULVER OR DR. VERA IN 2-3 DAYS IF NO BETTER


Scripts


Ondansetron (Ondansetron Odt) 4 Mg Tab.rapdis


4 MG PO Q4H for Nausea/Vomiting, #10 TAB


   Prov: MASOUD GUADARRAMA DO         6/4/22 


Hyoscyamine Sulfate (Levsin-Sl) 0.125 Mg Tab.subl


0.25 MG SL Q4H, #10 TAB


   Prov: MASOUD GUADARRAMA DO         6/4/22 


L. Acidophilus/Pectin, Citrus (Acidophilus Capsule) 7.5 Mg (30 Million Cell)-100

Mg Capsule


2 EACH PO QID, #40 CAP


   Prov: MASOUD GUADARRAMA DO         6/4/22











MASOUD GUADARRAMA DO                  Jun 4, 2022 19:56

## 2022-07-04 ENCOUNTER — HOSPITAL ENCOUNTER (EMERGENCY)
Dept: HOSPITAL 75 - ER | Age: 32
Discharge: HOME | End: 2022-07-04
Payer: COMMERCIAL

## 2022-07-04 VITALS — HEIGHT: 65 IN | WEIGHT: 244.71 LBS | BODY MASS INDEX: 40.77 KG/M2

## 2022-07-04 VITALS — DIASTOLIC BLOOD PRESSURE: 66 MMHG | SYSTOLIC BLOOD PRESSURE: 109 MMHG

## 2022-07-04 DIAGNOSIS — S61.021A: Primary | ICD-10-CM
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
## 2022-07-04 PROCEDURE — 99285 EMERGENCY DEPT VISIT HI MDM: CPT

## 2022-07-04 NOTE — ED UPPER EXTREMITY
General


Chief Complaint:  Laceration


Stated Complaint:  R THUMB LAC


Source:  patient


Exam Limitations:  no limitations





History of Present Illness


Date Seen by Provider:  Jul 4, 2022


Time Seen by Provider:  18:57


Initial Comments


Patient is a 31-year-old female who presents ED with with a laceration to her 

right thumb.  This occurred 30 minutes ago.  She was using a mandolin when she 

sliced her finger.  This resulted in a less than 1 cm laceration.  Bleeding 

controlled direct pressure.  Normal active range of motion of the finger.  

Up-to-date her tetanus within the past 3 weeks.  Neurovascular intact.





Allergies and Home Medications


Allergies


Coded Allergies:  


     No Known Drug Allergies (Unverified , 5/9/22)





Patient Home Medication List


Home Medication List Reviewed:  Yes


Docusate Sodium (Docusate Sodium) 100 Mg Capsule, 100 MG PO BID PRN for 

CONSTIPATION-1ST LINE


   Prescribed by: JANET VERA on 5/16/22 0710


Hydrocodone Bit/Acetaminophen (HYDROcodone/APAP 7.5/325 TAB) 1 Ea Tablet, 1-2 EA

PO Q6HR PRN for PAIN-MODERATE (5-7)


   Prescribed by: JANET VERA on 5/16/22 0710


Hyoscyamine Sulfate (Levsin-Sl) 0.125 Mg Tab.subl, 0.25 MG SL Q4H


   Prescribed by: MASOUD GUADARRAMA on 6/4/22 2307


Ibuprofen (Ibu) 600 Mg Tablet, 600 MG PO Q6HR


   Prescribed by: JANET VERA on 5/16/22 0710


L. Acidophilus/Pectin, Citrus (Acidophilus Capsule) 7.5 Mg (30 Million Cell)-100

Mg Capsule, 2 EACH PO QID


   Prescribed by: MASOUD GUADARRAMA on 6/4/22 2307


Multivitamin (Multivitamin) 1 Each Tablet, 1 EACH PO DAILY, (Reported)


   Entered as Reported by: ORION DEL ROSARIO on 11/11/21 0909


Ondansetron (Ondansetron Odt) 4 Mg Tab.rapdis, 4 MG PO Q4H


   Prescribed by: MASOUD GUADARRAMA on 6/4/22 2307


Simethicone (Simethicone) 80 Mg Tab.chew, 40 MG PO TID PRN for INDIGESTION 2ND 

LINE


   Prescribed by: JANET VERA on 5/16/22 0710





Review of Systems


Constitutional:  No chills, No diaphoresis, No malaise, No weakness


EENTM:  No ear pain, No blurred vision, No double vision


Respiratory:  No cough, No dyspnea on exertion


Cardiovascular:  No chest pain


Gastrointestinal:  No abdominal pain, No diarrhea, No nausea, No vomiting


Genitourinary:  No decreased output, No discharge


Musculoskeletal:  No back pain, No joint pain; muscle pain


Skin:  change in color





All Other Systems Reviewed


Negative Unless Noted:  Yes





Past Medical-Social-Family Hx


Immunizations Up To Date


Tetanus Booster (TDap):  More than 5yrs


First/Initial COVID19 Vaccinat:  2/21


Second COVID19 Vaccination Francisco J:  3/21


Third COVID19 Vaccination Date:  2/21





Seasonal Allergies


Seasonal Allergies:  No





Past Medical History


Surgery/Hospitalization HX:  


PCOS, D&Cx8, t/a, cholecystectomy


RA-LAV 05/16/22 BY DR. VERA


Surgeries:  Yes (endometriosis, ovarian cyst removal, d&c x7, uterine 

septumectomy)


Adenoidectomy, Cystectomy, Gallbladder, Hysterectomy, Oophorectomy, 

Tonsillectomy


Respiratory:  No


Currently Using CPAP:  No


Currently Using BIPAP:  No


Cardiac:  Yes (HYPOTENSIVE WITH SURGERY)


Neurological:  Yes (EPILEPSY AS CHILD)


Reproductive Disorders:  Yes (DUB, OVARIAN CYSTS, SEPTUM REMOVED FROM UTERUS; 

MULTIPLE MISCARRIAGES)


Female Reproductive Disorders:  Endometriosis, Ovarian Cyst


Sexually Transmitted Disease:  No


HIV/AIDS:  No


Genitourinary:  No


Gastrointestinal:  Yes


Gall Bladder Disease


Musculoskeletal:  No


Endocrine:  No


HEENT:  Yes


Tonsilitis


Loss of Vision:  Bilateral


Hearing Impairment:  Denies


Cancer:  No


Psychosocial:  No


Integumentary:  No


Blood Disorders:  No





Family Medical History





Asthma


  19 MOTHER


  maternal gdma


Cardiovascular disease


  maternal gdma


Completed stroke


  MATERNAL GDPA


Deafness or hearing loss


  19 MOTHER


Diabetes mellitus


  19 FATHER


Myocardial infarction


  maternal gdma


  MATERNAL GDPA


Psychosocial problem


  19 MOTHER


Respiratory disorder


  19 MOTHER


  maternal gdma


Seizure disorder


  19 MOTHER (EPILEPSY)


  G8 BROTHER (EPILEPSY)





No Family History of:


  AIDS


  Abdominal aortic aneurysm


  [place]ul's disease


  Alcoholism


  Alzheimer's disease


  Aphasia


  Arthritis


  Cancer of mouth


  Cataracts


  Colon cancer


  Congenital disease


  Congenital heart disease


  Coronary thrombosis


  Cystic fibrosis


  Dementia


  Drug abuse


  Dysphasia


  Fibrocystic disease of breast


  Gastroenteritis


  Glaucoma


  Headache disorder


  Hypercholesterolemia


  Hypertension


  Infertility


  Kidney disease


  Neoplasm


  Not obtainable due to adoption


  Osteoporosis


  Parkinson's disease


  Prostate cancer


  Severe allergy


  Thyroid disease


  Tuberculosis


  Visual disorder





Physical Exam


Vital Signs





Vital Signs - First Documented








 7/4/22





 18:44


 


Temp 37.0


 


Pulse 81


 


Resp 18


 


B/P (MAP) 109/66 (80)


 


Pulse Ox 98


 


O2 Delivery Room Air





Capillary Refill :


Height, Weight, BMI


Height: 5'4.00"


Weight: 272lbs. 0.0oz. 123.479599ig; 40.00 BMI


Method:Stated


General Appearance:  WD/WN, no apparent distress


HEENT:  PERRL/EOMI, normal ENT inspection, TMs normal, pharynx normal


Neck:  non-tender, full range of motion, supple


Cardiovascular:  regular rate, rhythm, no edema, no gallop, no JVD


Respiratory:  chest non-tender, lungs clear, normal breath sounds, no 

respiratory distress, no accessory muscle use


Gastrointestinal:  normal bowel sounds, non tender, soft, no organomegaly


Back:  normal inspection, no CVA tenderness, no vertebral tenderness


Hand:  normal ROM, Right, laceration (Less than 1 cm laceration to the right 

lateral abdominal.  No adipose involvement.  Skin approximated.)


Neurologic/Tendon:  normal sensation, normal motor functions, normal tendon 

functions


Neurologic/Psychiatric:  CNs II-XII nml as tested, no motor/sensory deficits, 

alert, normal mood/affect, oriented x 3


Skin:  other (Less than 1 cm superficial laceration to the right lateral thumb)





Procedures/Interventions





   Wound Location:  Upper Extremities


Other Wound Location


right thumb


   Wound Length (cm):  0.5


   Wound's Depth, Shape:  superficial


   Wound Explored:  clean


   Irrigated w/ Saline (ccs):  100


   Betadine Prep?:  Yes


Progress


1 Steri-Strip and an adhesive glue was applied topical





Progress/Results/Core Measures


Results/Orders


Vital Signs/I&O











 7/4/22





 18:44


 


Temp 37.0


 


Pulse 81


 


Resp 18


 


B/P (MAP) 109/66 (80)


 


Pulse Ox 98


 


O2 Delivery Room Air











Departure


Communication (PCP)


Patient with a less than 1 cm laceration to the right thumb.  Adhesive glue with

a Steri-Strip was placed.  Wound is not deep.  No tendon or muscular 

involvement.  Up-to-date her tetanus.  Discussed wound care.  If any worsening 

symptoms such as redness or swelling to return back to ED





Impression





   Primary Impression:  


   Finger laceration


Disposition:  01 HOME, SELF-CARE


Condition:  Stable





Departure-Patient Inst.


Decision time for Depature:  18:59


Referrals:  


JOEY CULVER MD (PCP/Family)


Primary Care Physician


Patient Instructions:  Laceration Repair With Glue ED











MESERET RICHARDS           Jul 4, 2022 18:59